# Patient Record
Sex: FEMALE | Race: WHITE | NOT HISPANIC OR LATINO | Employment: UNEMPLOYED | URBAN - METROPOLITAN AREA
[De-identification: names, ages, dates, MRNs, and addresses within clinical notes are randomized per-mention and may not be internally consistent; named-entity substitution may affect disease eponyms.]

---

## 2018-07-30 ENCOUNTER — HOSPITAL ENCOUNTER (EMERGENCY)
Facility: HOSPITAL | Age: 48
Discharge: HOME | End: 2018-07-30
Attending: EMERGENCY MEDICINE
Payer: MEDICAID

## 2018-07-30 VITALS
DIASTOLIC BLOOD PRESSURE: 80 MMHG | RESPIRATION RATE: 18 BRPM | TEMPERATURE: 98 F | OXYGEN SATURATION: 99 % | SYSTOLIC BLOOD PRESSURE: 145 MMHG | HEART RATE: 99 BPM

## 2018-07-30 DIAGNOSIS — M54.50 ACUTE LOW BACK PAIN WITHOUT SCIATICA, UNSPECIFIED BACK PAIN LATERALITY: Primary | ICD-10-CM

## 2018-07-30 DIAGNOSIS — M54.50 LOWER BACK PAIN: ICD-10-CM

## 2018-07-30 PROCEDURE — 25000003 PHARM REV CODE 250: Performed by: NURSE PRACTITIONER

## 2018-07-30 PROCEDURE — 96372 THER/PROPH/DIAG INJ SC/IM: CPT

## 2018-07-30 PROCEDURE — 99283 EMERGENCY DEPT VISIT LOW MDM: CPT | Mod: 25

## 2018-07-30 PROCEDURE — 63600175 PHARM REV CODE 636 W HCPCS: Performed by: NURSE PRACTITIONER

## 2018-07-30 RX ORDER — CYCLOBENZAPRINE HCL 10 MG
10 TABLET ORAL 3 TIMES DAILY PRN
Qty: 15 TABLET | Refills: 0 | Status: SHIPPED | OUTPATIENT
Start: 2018-07-30 | End: 2018-08-04

## 2018-07-30 RX ORDER — ONDANSETRON 8 MG/1
8 TABLET, ORALLY DISINTEGRATING ORAL
Status: COMPLETED | OUTPATIENT
Start: 2018-07-30 | End: 2018-07-30

## 2018-07-30 RX ORDER — MORPHINE SULFATE 4 MG/ML
6 INJECTION, SOLUTION INTRAMUSCULAR; INTRAVENOUS
Status: COMPLETED | OUTPATIENT
Start: 2018-07-30 | End: 2018-07-30

## 2018-07-30 RX ORDER — ACETAMINOPHEN AND CODEINE PHOSPHATE 300; 30 MG/1; MG/1
1-2 TABLET ORAL EVERY 6 HOURS PRN
Qty: 14 TABLET | Refills: 0 | Status: SHIPPED | OUTPATIENT
Start: 2018-07-30 | End: 2019-03-11

## 2018-07-30 RX ADMIN — ONDANSETRON 8 MG: 8 TABLET, ORALLY DISINTEGRATING ORAL at 03:07

## 2018-07-30 RX ADMIN — MORPHINE SULFATE 6 MG: 4 INJECTION INTRAVENOUS at 03:07

## 2018-07-30 NOTE — ED PROVIDER NOTES
Encounter Date: 2018       History     Chief Complaint   Patient presents with    Back Pain     chronic back pain, now pt states she cannot stand up straight. reports hearing a pop in the shower.      48 year old female with complaint of lower back pain chronically with worsening over the past 3 days.  Reports that she bent over a few days ago and felt pop in back when standing.  No recent fall.  No fever or chills.  Pain worse with any movement.  No loss of bowel or bladder function.           Review of patient's allergies indicates:   Allergen Reactions    Penicillins Swelling     Past Medical History:   Diagnosis Date    Chronic back pain     Migraine      Past Surgical History:   Procedure Laterality Date     SECTION      HYSTERECTOMY       History reviewed. No pertinent family history.  Social History   Substance Use Topics    Smoking status: Current Every Day Smoker     Packs/day: 1.00     Types: Cigarettes    Smokeless tobacco: Not on file    Alcohol use Yes      Comment: occ     Review of Systems   Constitutional: Negative for fever.   HENT: Negative for sore throat.    Respiratory: Negative for shortness of breath.    Cardiovascular: Negative for chest pain.   Gastrointestinal: Negative for nausea.   Genitourinary: Negative for dysuria.   Musculoskeletal: Positive for back pain.   Skin: Negative for rash.   Neurological: Negative for weakness.   Hematological: Does not bruise/bleed easily.       Physical Exam     Initial Vitals [18 1335]   BP Pulse Resp Temp SpO2   (!) 145/80 99 18 97.8 °F (36.6 °C) 99 %      MAP       --         Physical Exam    Nursing note and vitals reviewed.  Constitutional: She appears well-developed and well-nourished.   HENT:   Head: Normocephalic and atraumatic.   Eyes: Conjunctivae and EOM are normal. Pupils are equal, round, and reactive to light.   Neck: Normal range of motion. Neck supple.   Cardiovascular: Normal rate, regular rhythm, normal heart  sounds and intact distal pulses.   Pulmonary/Chest: Breath sounds normal.   Abdominal: Soft. There is no tenderness. There is no rebound and no guarding.   Musculoskeletal: Normal range of motion.   Diffuse lumbar tenderness, pain with ROM of lumbar spine, straight leg negative   Neurological: She is alert and oriented to person, place, and time. She has normal strength and normal reflexes.   Skin: Skin is warm and dry.   Psychiatric: She has a normal mood and affect. Her behavior is normal. Thought content normal.         ED Course   Procedures  Labs Reviewed - No data to display       Imaging Results          X-Ray Lumbar Spine Ap And Lateral (Final result)  Result time 07/30/18 14:57:19    Final result by Sammy Scott MD (07/30/18 14:57:19)                 Impression:      Chronic L1 compression fracture, 50-75% loss of height.      Electronically signed by: Sammy Scott MD  Date:    07/30/2018  Time:    14:57             Narrative:    EXAMINATION:  XR LUMBAR SPINE AP AND LATERAL    CLINICAL HISTORY:  Low back pain, <6wks, no red flags, no prior management;Low back pain    FINDINGS:  There is a chronic compression fracture deformity, L1 vertebral body, with anterior wedging and 57 5% loss of height anteriorly, no change configuration compared with the remote chest x-ray 05/02/2017.  There is no new compression deformity of the lumbar spine.  No malalignment.  Disc space narrowing and anterior spurring noted at T12-L1.  Mild L1-2 spondylosis.  Otherwise, the disc intervals are unremarkable.                                                      Clinical Impression:   The primary encounter diagnosis was Acute low back pain without sciatica, unspecified back pain laterality. A diagnosis of Lower back pain was also pertinent to this visit.                             Raghav Ho NP  07/30/18 0959

## 2019-02-12 ENCOUNTER — HOSPITAL ENCOUNTER (EMERGENCY)
Facility: HOSPITAL | Age: 49
Discharge: HOME | End: 2019-02-12
Attending: FAMILY MEDICINE
Payer: MEDICAID

## 2019-02-12 VITALS
RESPIRATION RATE: 16 BRPM | SYSTOLIC BLOOD PRESSURE: 120 MMHG | OXYGEN SATURATION: 97 % | TEMPERATURE: 99 F | DIASTOLIC BLOOD PRESSURE: 78 MMHG | WEIGHT: 124.75 LBS | HEART RATE: 84 BPM | BODY MASS INDEX: 22.82 KG/M2

## 2019-02-12 DIAGNOSIS — J40 BRONCHITIS: ICD-10-CM

## 2019-02-12 DIAGNOSIS — R05.9 COUGH WITH FEVER: ICD-10-CM

## 2019-02-12 DIAGNOSIS — J10.1 INFLUENZA A: Primary | ICD-10-CM

## 2019-02-12 DIAGNOSIS — R50.9 COUGH WITH FEVER: ICD-10-CM

## 2019-02-12 LAB
INFLUENZA A, MOLECULAR: POSITIVE
INFLUENZA B, MOLECULAR: NEGATIVE
SPECIMEN SOURCE: ABNORMAL

## 2019-02-12 PROCEDURE — 25000242 PHARM REV CODE 250 ALT 637 W/ HCPCS: Performed by: NURSE PRACTITIONER

## 2019-02-12 PROCEDURE — 94640 AIRWAY INHALATION TREATMENT: CPT

## 2019-02-12 PROCEDURE — 63600175 PHARM REV CODE 636 W HCPCS: Performed by: NURSE PRACTITIONER

## 2019-02-12 PROCEDURE — 96372 THER/PROPH/DIAG INJ SC/IM: CPT

## 2019-02-12 PROCEDURE — 87502 INFLUENZA DNA AMP PROBE: CPT

## 2019-02-12 PROCEDURE — 99284 EMERGENCY DEPT VISIT MOD MDM: CPT | Mod: 25

## 2019-02-12 RX ORDER — PREDNISONE 20 MG/1
20 TABLET ORAL 2 TIMES DAILY
Qty: 10 TABLET | Refills: 0 | Status: SHIPPED | OUTPATIENT
Start: 2019-02-12 | End: 2019-02-17

## 2019-02-12 RX ORDER — IPRATROPIUM BROMIDE AND ALBUTEROL SULFATE 2.5; .5 MG/3ML; MG/3ML
3 SOLUTION RESPIRATORY (INHALATION)
Status: COMPLETED | OUTPATIENT
Start: 2019-02-12 | End: 2019-02-12

## 2019-02-12 RX ORDER — OSELTAMIVIR PHOSPHATE 75 MG/1
75 CAPSULE ORAL 2 TIMES DAILY
Qty: 10 CAPSULE | Refills: 0 | Status: SHIPPED | OUTPATIENT
Start: 2019-02-12 | End: 2019-02-17

## 2019-02-12 RX ORDER — ALBUTEROL SULFATE 90 UG/1
1-2 AEROSOL, METERED RESPIRATORY (INHALATION) EVERY 6 HOURS PRN
Qty: 1 INHALER | Refills: 0 | Status: SHIPPED | OUTPATIENT
Start: 2019-02-12 | End: 2019-10-25

## 2019-02-12 RX ORDER — BENZONATATE 100 MG/1
100 CAPSULE ORAL 3 TIMES DAILY PRN
Qty: 20 CAPSULE | Refills: 0 | Status: SHIPPED | OUTPATIENT
Start: 2019-02-12 | End: 2019-02-22

## 2019-02-12 RX ADMIN — METHYLPREDNISOLONE SODIUM SUCCINATE 80 MG: 40 INJECTION, POWDER, FOR SOLUTION INTRAMUSCULAR; INTRAVENOUS at 08:02

## 2019-02-12 RX ADMIN — IPRATROPIUM BROMIDE AND ALBUTEROL SULFATE 3 ML: .5; 3 SOLUTION RESPIRATORY (INHALATION) at 07:02

## 2019-02-13 NOTE — DISCHARGE INSTRUCTIONS
**Follow up with PCP in 24-48 hours. Return to ER with worsening of symptoms.     **Over the counter tylenol or motrin as needed for pain and/or fever as directed on package insert. Drink plenty fluids. Get plenty rest. Wash hands frequently.     **Our goal in the emergency department is to always give you outstanding care and exceptional service. You may receive a survey by mail or e-mail in the next week regarding your experience in our ED. We would greatly appreciate your completing and returning the survey. Your feedback provides us with a way to recognize our staff who give very good care and it helps us learn how to improve when your experience was below our aspiration of excellence.      Rest; drink lots of fluids(at least 10 glasses of water daily).  Avoid crowded areas  To avoid spreading: cover your mouth when coughing or sneezing  Use tissues when you blow your nose. Dispose of them and then wash your hands  Do not share drinking glasses  Wash your hands with soap and water or use hand .

## 2019-02-13 NOTE — ED PROVIDER NOTES
Encounter Date: 2019       History     Chief Complaint   Patient presents with    Cough     dry cough x2 months, no otc meds taken     Steph DANYELL Harkins is a 48 y.o. Female who presents to the ED with reports of nasal congestion, sore throat and cough. Symptoms began X 2 days ago. Subjective fever of  .0F at home taken with oral thermometer with associated generalized body aches and chills. Clear nasal discharge; denies sinus pressure or pain; denies headache or tooth sensitivity. Throat pain with swallowing; denies difficulty swallowing. Strong, loose NP cough noted; reports cough present X 3 months; denies ever seeing PCP for cough. Denies cp or sob; denies wheezing. +exposure to sick family members. Reports taking otc cough and cold medicine with little relief. Patient is a smoker. Denies receiving influenza vaccine.       The history is provided by the patient.     Review of patient's allergies indicates:   Allergen Reactions    Penicillins Swelling     Past Medical History:   Diagnosis Date    Chronic back pain     Migraine      Past Surgical History:   Procedure Laterality Date     SECTION      HYSTERECTOMY       History reviewed. No pertinent family history.  Social History     Tobacco Use    Smoking status: Current Every Day Smoker     Packs/day: 1.00     Types: Cigarettes   Substance Use Topics    Alcohol use: Yes     Comment: occ    Drug use: No     Review of Systems   Constitutional: Positive for chills. Negative for activity change and fever.   HENT: Positive for congestion, postnasal drip, rhinorrhea and sore throat. Negative for ear discharge, ear pain, sinus pressure and sinus pain.    Eyes: Negative.    Respiratory: Positive for cough. Negative for chest tightness and shortness of breath.    Cardiovascular: Negative.  Negative for chest pain.   Gastrointestinal: Negative.  Negative for abdominal distention, abdominal pain and nausea.   Endocrine: Negative.    Genitourinary:  Negative.  Negative for dysuria, frequency and urgency.   Musculoskeletal: Negative.  Negative for back pain.   Skin: Negative.  Negative for rash.   Allergic/Immunologic: Negative.    Neurological: Negative.  Negative for dizziness, weakness, light-headedness and numbness.   Hematological: Negative.  Does not bruise/bleed easily.   Psychiatric/Behavioral: Negative.        Physical Exam     Initial Vitals [02/12/19 1815]   BP Pulse Resp Temp SpO2   121/78 88 (!) 22 99.7 °F (37.6 °C) 99 %      MAP       --         Physical Exam    Nursing note and vitals reviewed.  Constitutional: She appears well-developed and well-nourished.   HENT:   Head: Normocephalic and atraumatic.   Right Ear: External ear normal.   Left Ear: External ear normal.   Nose: Rhinorrhea present.   Mouth/Throat: Uvula is midline and mucous membranes are normal. No oropharyngeal exudate, posterior oropharyngeal edema or posterior oropharyngeal erythema.   Eyes: Conjunctivae and EOM are normal. Pupils are equal, round, and reactive to light.   Neck: Normal range of motion. Neck supple.   Cardiovascular: Normal rate, regular rhythm, normal heart sounds and intact distal pulses.   Pulmonary/Chest: Effort normal. No tachypnea. No respiratory distress. She has no decreased breath sounds. She has no wheezes. She has rhonchi. She has no rales.   Rhonchi to BUL's; clears with cough. Strong, loose NP cough noted.    Abdominal: Soft. Bowel sounds are normal. There is no tenderness.   Musculoskeletal: Normal range of motion.   Neurological: She is alert and oriented to person, place, and time. She has normal strength. She displays normal reflexes. No cranial nerve deficit or sensory deficit.   Skin: Skin is warm and dry. Capillary refill takes less than 2 seconds. No rash noted.   Psychiatric: She has a normal mood and affect. Her behavior is normal. Judgment and thought content normal.         ED Course   Procedures  Labs Reviewed   INFLUENZA A & B BY  MOLECULAR - Abnormal; Notable for the following components:       Result Value    Influenza A, Molecular Positive (*)     All other components within normal limits          Imaging Results          X-Ray Chest PA And Lateral (Final result)  Result time 02/12/19 19:30:04    Final result by Godfrey Bull Jr., MD (02/12/19 19:30:04)                 Impression:      No acute findings.      Electronically signed by: Godfrey Bull MD  Date:    02/12/2019  Time:    19:30             Narrative:    EXAMINATION:  XR CHEST PA AND LATERAL    CLINICAL HISTORY:  Cough    COMPARISON:  No comparison studies are available.    FINDINGS:  Heart size is normal.  Lungs appear clear of active disease.  No infiltrates or effusions.  No suspicious mass.                                  Medications   albuterol-ipratropium 2.5 mg-0.5 mg/3 mL nebulizer solution 3 mL (3 mLs Nebulization Given 2/12/19 1929)   methylPREDNISolone sodium succinate injection 80 mg (80 mg Intramuscular Given 2/12/19 2020)                          Clinical Impression:   The primary encounter diagnosis was Influenza A. Diagnoses of Cough with fever and Bronchitis were also pertinent to this visit.      Disposition:   Disposition: Discharged  Condition: Stable    Discharged with prescription for tamiflu, prednisone, tessalon, and albuterol inhaler. The patient acknowledges that close follow up with medical provider is required. Instructed to follow up with PCP within 2 days. Patient was given specific return precautions. The patient agrees to comply with all instruction and directions given in the ER. List of providers provided to patient.                     Lelia Cloud NP  02/12/19 [id number]

## 2019-02-13 NOTE — ED TRIAGE NOTES
48 y.o. female presents to ER Room/bed info not found   Chief Complaint   Patient presents with    Cough     dry cough x2 months, no otc meds taken   . No acute distress noted.

## 2019-02-14 PROBLEM — Z72.0 TOBACCO ABUSE: Status: ACTIVE | Noted: 2019-02-14

## 2019-02-14 PROBLEM — J10.1 INFLUENZA A: Status: ACTIVE | Noted: 2019-02-14

## 2019-03-14 ENCOUNTER — LAB VISIT (OUTPATIENT)
Dept: LAB | Facility: HOSPITAL | Age: 49
End: 2019-03-14
Payer: MEDICAID

## 2019-03-14 DIAGNOSIS — Z12.11 COLON CANCER SCREENING: ICD-10-CM

## 2019-03-14 LAB — HEMOCCULT STL QL IA: NEGATIVE

## 2019-03-14 PROCEDURE — 82274 ASSAY TEST FOR BLOOD FECAL: CPT

## 2019-04-01 PROBLEM — J10.1 INFLUENZA A: Status: RESOLVED | Noted: 2019-02-14 | Resolved: 2019-04-01

## 2019-07-31 ENCOUNTER — HOSPITAL ENCOUNTER (EMERGENCY)
Facility: HOSPITAL | Age: 49
Discharge: HOME | End: 2019-07-31
Attending: SURGERY
Payer: MEDICAID

## 2019-07-31 VITALS
DIASTOLIC BLOOD PRESSURE: 98 MMHG | RESPIRATION RATE: 18 BRPM | HEART RATE: 82 BPM | SYSTOLIC BLOOD PRESSURE: 132 MMHG | TEMPERATURE: 99 F | OXYGEN SATURATION: 96 %

## 2019-07-31 DIAGNOSIS — M25.562 LEFT KNEE PAIN, UNSPECIFIED CHRONICITY: Primary | ICD-10-CM

## 2019-07-31 PROCEDURE — 99284 EMERGENCY DEPT VISIT MOD MDM: CPT | Mod: 25

## 2019-07-31 PROCEDURE — 29505 APPLICATION LONG LEG SPLINT: CPT | Mod: LT

## 2019-07-31 PROCEDURE — 25000003 PHARM REV CODE 250: Performed by: NURSE PRACTITIONER

## 2019-07-31 RX ORDER — KETOROLAC TROMETHAMINE 10 MG/1
10 TABLET, FILM COATED ORAL EVERY 6 HOURS PRN
Qty: 15 TABLET | Refills: 0 | Status: SHIPPED | OUTPATIENT
Start: 2019-07-31 | End: 2020-03-03 | Stop reason: ALTCHOICE

## 2019-07-31 RX ORDER — KETOROLAC TROMETHAMINE 10 MG/1
10 TABLET, FILM COATED ORAL
Status: COMPLETED | OUTPATIENT
Start: 2019-07-31 | End: 2019-07-31

## 2019-07-31 RX ORDER — CYCLOBENZAPRINE HCL 10 MG
10 TABLET ORAL 3 TIMES DAILY PRN
Qty: 10 TABLET | Refills: 0 | Status: SHIPPED | OUTPATIENT
Start: 2019-07-31 | End: 2019-08-05

## 2019-07-31 RX ADMIN — KETOROLAC TROMETHAMINE 10 MG: 10 TABLET, FILM COATED ORAL at 01:07

## 2019-07-31 NOTE — ED PROVIDER NOTES
Encounter Date: 2019       History     Chief Complaint   Patient presents with    Knee Pain     The history is provided by the patient.   Leg Pain    The injury mechanism was a fall. The incident occurred several days ago (3). The pain is present in the left knee. The pain has been constant since onset. Pertinent negatives include no numbness, no inability to bear weight, no loss of motion, no muscle weakness, no loss of sensation and no tingling. She reports no foreign bodies present. The symptoms are aggravated by activity, bearing weight and palpation.     Review of patient's allergies indicates:   Allergen Reactions    Bee sting [allergen ext-venom-honey bee] Swelling    Penicillins Swelling    Peas Swelling     Past Medical History:   Diagnosis Date    Asthma     Chronic back pain     Migraine      Past Surgical History:   Procedure Laterality Date     SECTION      HYSTERECTOMY      total    OOPHORECTOMY       Family History   Problem Relation Age of Onset    Hypertension Mother     Heart disease Mother     No Known Problems Father     Cancer Maternal Aunt     Heart disease Maternal Uncle     Breast cancer Maternal Aunt     Cancer Maternal Cousin      Social History     Tobacco Use    Smoking status: Current Every Day Smoker     Packs/day: 1.00     Years: 36.00     Pack years: 36.00     Types: Cigarettes    Smokeless tobacco: Never Used   Substance Use Topics    Alcohol use: Yes     Comment: occ    Drug use: No     Review of Systems   Constitutional: Negative for fever.   HENT: Negative for congestion, ear pain, rhinorrhea, sore throat and trouble swallowing.    Eyes: Negative for pain, discharge, redness and visual disturbance.   Respiratory: Negative for cough, shortness of breath and wheezing.    Cardiovascular: Negative for chest pain and leg swelling.   Gastrointestinal: Negative for abdominal pain, constipation, diarrhea, nausea and vomiting.   Genitourinary: Negative for  difficulty urinating, dysuria, flank pain, frequency and urgency.   Musculoskeletal: Positive for arthralgias (L knee). Negative for back pain, myalgias and neck pain.   Skin: Negative for rash and wound.   Neurological: Negative for tingling, seizures, weakness, numbness and headaches.   Psychiatric/Behavioral: Negative.        Physical Exam     Initial Vitals [07/31/19 1303]   BP Pulse Resp Temp SpO2   (!) 132/98 82 18 98.9 °F (37.2 °C) 96 %      MAP       --         Physical Exam    Nursing note and vitals reviewed.  Constitutional: No distress.   HENT:   Head: Normocephalic and atraumatic.   Right Ear: External ear normal.   Left Ear: External ear normal.   Nose: Nose normal.   Mouth/Throat: Oropharynx is clear and moist.   Eyes: Conjunctivae, EOM and lids are normal. Pupils are equal, round, and reactive to light.   Neck: Neck supple.   Cardiovascular: Normal rate, regular rhythm, S1 normal, S2 normal, normal heart sounds and intact distal pulses.   Pulmonary/Chest: Effort normal and breath sounds normal. No respiratory distress.   Abdominal: Soft. Bowel sounds are normal. There is no tenderness.   Musculoskeletal: Normal range of motion.        Left knee: She exhibits swelling and ecchymosis. She exhibits normal range of motion, no deformity, no laceration, no erythema, normal alignment and normal patellar mobility. Tenderness found. Medial joint line tenderness noted.   Neurological: She is alert and oriented to person, place, and time. She has normal strength. GCS eye subscore is 4. GCS verbal subscore is 5. GCS motor subscore is 6.   Skin: Skin is warm and dry. Capillary refill takes less than 2 seconds. No rash noted.   Psychiatric: She has a normal mood and affect. Her speech is normal and behavior is normal.         ED Course   Procedures  Labs Reviewed - No data to display       Imaging Results          X-Ray Knee 1 or 2 View Left (Final result)  Result time 07/31/19 13:46:07    Final result by Stewart  Lyle Menezes MD (07/31/19 13:46:07)                 Impression:      No evidence of an acute osseous abnormality.      Electronically signed by: Stewart Menezes MD  Date:    07/31/2019  Time:    13:46             Narrative:    EXAMINATION:  XR KNEE 1 OR 2 VIEW LEFT    CLINICAL HISTORY:  .  Pain in left knee    TECHNIQUE:  Two views of the left knee dated July 31, 2019.    COMPARISON:  No prior study for comparison.    FINDINGS:  There is no evidence of fracture, dislocation or other acute osseous abnormality. No focal soft tissue abnormality.                                     Medications   ketorolac tablet 10 mg (10 mg Oral Given 7/31/19 1330)                       Clinical Impression:       ICD-10-CM ICD-9-CM   1. Left knee pain, unspecified chronicity M25.562 719.46     New Prescriptions    CYCLOBENZAPRINE (FLEXERIL) 10 MG TABLET    Take 1 tablet (10 mg total) by mouth 3 (three) times daily as needed for Muscle spasms.    KETOROLAC (TORADOL) 10 MG TABLET    Take 1 tablet (10 mg total) by mouth every 6 (six) hours as needed for Pain.       Disposition:   Disposition: Discharged  Condition: Stable    The patient acknowledges that close follow up with medical provider is required. Instructed to follow up with PCP within 2 days. Patient was given specific return precautions. The patient agrees to comply with all instruction and directions given in the ER.                         Sophie Fernández NP  07/31/19 9028

## 2019-07-31 NOTE — ED TRIAGE NOTES
49 y.o. female presents to ER Room/bed info not found   Chief Complaint   Patient presents with    Knee Pain   .   C/o left knee pain since a fall on Sunday

## 2019-08-05 ENCOUNTER — TELEPHONE (OUTPATIENT)
Dept: EMERGENCY MEDICINE | Facility: HOSPITAL | Age: 49
End: 2019-08-05

## 2019-08-20 ENCOUNTER — PATIENT OUTREACH (OUTPATIENT)
Dept: ADMINISTRATIVE | Facility: HOSPITAL | Age: 49
End: 2019-08-20

## 2019-10-25 ENCOUNTER — HOSPITAL ENCOUNTER (EMERGENCY)
Facility: HOSPITAL | Age: 49
Discharge: HOME | End: 2019-10-25
Attending: SURGERY
Payer: MEDICAID

## 2019-10-25 VITALS
HEART RATE: 78 BPM | WEIGHT: 124 LBS | OXYGEN SATURATION: 95 % | RESPIRATION RATE: 21 BRPM | TEMPERATURE: 98 F | HEIGHT: 64 IN | DIASTOLIC BLOOD PRESSURE: 72 MMHG | BODY MASS INDEX: 21.17 KG/M2 | SYSTOLIC BLOOD PRESSURE: 127 MMHG

## 2019-10-25 DIAGNOSIS — J40 BRONCHITIS: Primary | ICD-10-CM

## 2019-10-25 PROCEDURE — 25000242 PHARM REV CODE 250 ALT 637 W/ HCPCS: Performed by: SURGERY

## 2019-10-25 PROCEDURE — 94640 AIRWAY INHALATION TREATMENT: CPT

## 2019-10-25 PROCEDURE — 63600175 PHARM REV CODE 636 W HCPCS: Performed by: SURGERY

## 2019-10-25 PROCEDURE — 96372 THER/PROPH/DIAG INJ SC/IM: CPT

## 2019-10-25 PROCEDURE — 99284 EMERGENCY DEPT VISIT MOD MDM: CPT | Mod: 25

## 2019-10-25 RX ORDER — PROMETHAZINE HYDROCHLORIDE AND DEXTROMETHORPHAN HYDROBROMIDE 6.25; 15 MG/5ML; MG/5ML
5 SYRUP ORAL EVERY 6 HOURS PRN
Qty: 118 ML | Refills: 0 | Status: SHIPPED | OUTPATIENT
Start: 2019-10-25 | End: 2019-11-04

## 2019-10-25 RX ORDER — LEVOFLOXACIN 500 MG/1
500 TABLET, FILM COATED ORAL DAILY
Qty: 7 TABLET | Refills: 0 | Status: SHIPPED | OUTPATIENT
Start: 2019-10-25 | End: 2019-11-01

## 2019-10-25 RX ORDER — ALBUTEROL SULFATE 90 UG/1
1-2 AEROSOL, METERED RESPIRATORY (INHALATION) EVERY 6 HOURS PRN
Qty: 1 INHALER | Refills: 0 | Status: SHIPPED | OUTPATIENT
Start: 2019-10-25 | End: 2024-03-07

## 2019-10-25 RX ORDER — METHYLPREDNISOLONE SOD SUCC 125 MG
125 VIAL (EA) INJECTION
Status: COMPLETED | OUTPATIENT
Start: 2019-10-25 | End: 2019-10-25

## 2019-10-25 RX ORDER — IPRATROPIUM BROMIDE AND ALBUTEROL SULFATE 2.5; .5 MG/3ML; MG/3ML
3 SOLUTION RESPIRATORY (INHALATION)
Status: COMPLETED | OUTPATIENT
Start: 2019-10-25 | End: 2019-10-25

## 2019-10-25 RX ORDER — METHYLPREDNISOLONE 4 MG/1
TABLET ORAL
Qty: 1 PACKAGE | Refills: 0 | Status: SHIPPED | OUTPATIENT
Start: 2019-10-25 | End: 2020-03-03 | Stop reason: ALTCHOICE

## 2019-10-25 RX ADMIN — IPRATROPIUM BROMIDE AND ALBUTEROL SULFATE 3 ML: .5; 3 SOLUTION RESPIRATORY (INHALATION) at 04:10

## 2019-10-25 RX ADMIN — METHYLPREDNISOLONE SODIUM SUCCINATE 125 MG: 125 INJECTION, POWDER, FOR SOLUTION INTRAMUSCULAR; INTRAVENOUS at 05:10

## 2019-10-25 NOTE — ED PROVIDER NOTES
[facility]                                                 Chief Complaint  49 y.o. female with Cough (x 1 week)    History of Present Illness  Steph Harkins presents to the emergency room with cough and congestion  Patient has cough and congestion with no active wheezing on ER evaluation  Patient is a long-time smoker but quit smoking last week because of cough  Patient on exam has rhonchi with no active wheezing, 95% oxygenation today  No obvious brown shortness of breath and denies any sputum production today    The history is provided by the patient   device was not used during this ER visit  Medical history: Asthma, chronic back pain, migraine  Surgeries: , hysterectomy, oophorectomy  Allergies: Bee stings, penicillins, peas    I have reviewed all of this patient's past medical, surgical, family, and social   histories as well as active allergies and medications documented in the  electronic medical record    Review of Systems and Physical Exam      Review of Systems  -- Constitution - no fever, denies fatigue, no weakness, no chills  -- Eyes - no tearing or redness, no visual disturbance  -- Ear, Nose - no tinnitus or earache, no nasal congestion or discharge  -- Mouth,Throat - no sore throat, no toothache, normal voice, normal swallowing  -- Respiratory - cough and congestion, no shortness of breath, no SOLOMON  -- Cardiovascular - denies chest pain, no palpitations, denies claudication  -- Gastrointestinal - denies abdominal pain, nausea, vomiting, or diarrhea  -- Genitourinary - no dysuria, denies flank pain, no hematuria, no STD risk  -- Musculoskeletal - denies back pain, negative for trauma or injury  -- Neurological - no headache, denies weakness or seizure; no LOC  -- Skin - denies pallor, rash, or changes in skin. no hives or welts noted  -- Psychiatric - Denies SI or HI, no psychosis or fractured thought noted     Vital Signs  Her temperature is 97.7 °F  (36.5 °C).   Her pulse is 92.   Her respiration is 22 and oxygen saturation is 95%.     Physical Exam  -- Nursing note and vitals reviewed  -- Constitutional: Appears well-developed and well-nourished  -- Head: Atraumatic. Normocephalic. No obvious abnormality  -- Eyes: Pupils are equal and reactive to light. Normal conjunctiva and lids  -- Nose: Nose normal in appearance, nares grossly normal. No discharge  -- Throat: Mucous membranes moist, pharynx normal, normal tonsils. No lesions   -- Ears: External ears and TM normal bilaterally. Normal hearing and no drainage  -- Neck: Normal range of motion. Neck supple. No masses, trachea midline  -- Cardiac: Normal rate, regular rhythm and normal heart sounds  -- Pulmonary: faint rhonchi at the bilateral bases with no active wheezing   -- Abdominal: Soft, no tenderness. Normal bowel sounds. Normal liver edge  -- Musculoskeletal: Normal range of motion, no effusions. Joints stable   -- Neurological: No focal deficits. Showed good interaction with staff  -- Vascular: Posterior tibial, dorsalis pedis and radial pulses 2+ bilaterally      Emergency Room Course      Treatment and Evaluation  -- Chest x-ray showed no infiltrate and showed no acute pathology  --  mg Solumedrol given today in the ER  -- Duoneb breathing treatment given today in the ER    Diagnosis  -- The encounter diagnosis was Bronchitis.    Disposition and Plan  -- Disposition: home  -- Condition: stable  -- Follow-up: Patient to follow up with TINO Emmanuel in 1-2 days.  -- I advised the patient that we have found no life threatening condition today  -- At this time, I believe the patient is clinically stable for discharge.   -- The patient acknowledges that close follow up with a MD is required   -- Patient agrees to comply with all instruction and direction given in the ER    Discharge Medications  albuterol 90 mcg/actuation inhaler  levoFLOXacin 500 MG tablet  methylPREDNISolone 4 mg  tablet    This note is dictated on M*Modal word recognition program.  There are word recognition mistakes that are occasionally missed on review.         Elvis Valentino MD  10/25/19 0997

## 2019-10-25 NOTE — ED TRIAGE NOTES
49 y.o. female presents to ER ED 02/ED 02A   Chief Complaint   Patient presents with    Cough     x 1 week

## 2020-03-03 PROBLEM — F41.1 GAD (GENERALIZED ANXIETY DISORDER): Status: ACTIVE | Noted: 2020-03-03

## 2020-03-03 PROBLEM — E78.01 FAMILIAL HYPERCHOLESTEROLEMIA: Status: ACTIVE | Noted: 2020-03-03

## 2020-04-11 ENCOUNTER — LAB VISIT (OUTPATIENT)
Dept: LAB | Facility: HOSPITAL | Age: 50
End: 2020-04-11
Attending: NURSE PRACTITIONER
Payer: MEDICAID

## 2020-04-11 DIAGNOSIS — Z12.11 SCREEN FOR COLON CANCER: ICD-10-CM

## 2020-04-11 PROCEDURE — 82274 ASSAY TEST FOR BLOOD FECAL: CPT

## 2020-04-15 LAB — HEMOCCULT STL QL IA: NEGATIVE

## 2020-06-30 DIAGNOSIS — F41.1 GAD (GENERALIZED ANXIETY DISORDER): ICD-10-CM

## 2020-06-30 DIAGNOSIS — M25.569 KNEE PAIN, UNSPECIFIED CHRONICITY, UNSPECIFIED LATERALITY: Primary | ICD-10-CM

## 2021-04-09 DIAGNOSIS — R05.9 COUGH: ICD-10-CM

## 2021-04-09 DIAGNOSIS — R06.2 WHEEZING: Primary | ICD-10-CM

## 2021-04-09 DIAGNOSIS — Z11.59 SPECIAL SCREENING EXAMINATION FOR UNSPECIFIED VIRAL DISEASE: ICD-10-CM

## 2021-04-11 ENCOUNTER — HOSPITAL ENCOUNTER (OUTPATIENT)
Dept: PREADMISSION TESTING | Facility: HOSPITAL | Age: 51
Discharge: HOME | End: 2021-04-11
Attending: NURSE PRACTITIONER
Payer: MEDICAID

## 2021-04-11 DIAGNOSIS — Z11.59 SPECIAL SCREENING EXAMINATION FOR UNSPECIFIED VIRAL DISEASE: ICD-10-CM

## 2021-04-11 PROCEDURE — U0003 INFECTIOUS AGENT DETECTION BY NUCLEIC ACID (DNA OR RNA); SEVERE ACUTE RESPIRATORY SYNDROME CORONAVIRUS 2 (SARS-COV-2) (CORONAVIRUS DISEASE [COVID-19]), AMPLIFIED PROBE TECHNIQUE, MAKING USE OF HIGH THROUGHPUT TECHNOLOGIES AS DESCRIBED BY CMS-2020-01-R: HCPCS | Performed by: NURSE PRACTITIONER

## 2021-04-11 PROCEDURE — U0005 INFEC AGEN DETEC AMPLI PROBE: HCPCS | Performed by: NURSE PRACTITIONER

## 2021-04-12 LAB — SARS-COV-2 RNA RESP QL NAA+PROBE: NOT DETECTED

## 2021-04-14 ENCOUNTER — HOSPITAL ENCOUNTER (OUTPATIENT)
Dept: PULMONOLOGY | Facility: HOSPITAL | Age: 51
Discharge: HOME | End: 2021-04-14
Attending: NURSE PRACTITIONER
Payer: MEDICAID

## 2021-04-14 DIAGNOSIS — R06.2 WHEEZING: ICD-10-CM

## 2021-04-14 DIAGNOSIS — R05.9 COUGH: ICD-10-CM

## 2021-04-14 PROCEDURE — 94727 GAS DIL/WSHOT DETER LNG VOL: CPT

## 2021-04-14 PROCEDURE — 94060 PR EVAL OF BRONCHOSPASM: ICD-10-PCS | Mod: 26,,, | Performed by: INTERNAL MEDICINE

## 2021-04-14 PROCEDURE — 94060 EVALUATION OF WHEEZING: CPT | Mod: 26,,, | Performed by: INTERNAL MEDICINE

## 2021-04-14 PROCEDURE — 99900031 HC PATIENT EDUCATION (STAT)

## 2021-04-14 PROCEDURE — 94729 PR C02/MEMBANE DIFFUSE CAPACITY: ICD-10-PCS | Mod: 26,,, | Performed by: INTERNAL MEDICINE

## 2021-04-14 PROCEDURE — 94727 PR PULM FUNCTION TEST BY GAS: ICD-10-PCS | Mod: 26,,, | Performed by: INTERNAL MEDICINE

## 2021-04-14 PROCEDURE — 94060 EVALUATION OF WHEEZING: CPT

## 2021-04-14 PROCEDURE — 94729 DIFFUSING CAPACITY: CPT

## 2021-04-14 PROCEDURE — 94729 DIFFUSING CAPACITY: CPT | Mod: 26,,, | Performed by: INTERNAL MEDICINE

## 2021-04-14 PROCEDURE — 94727 GAS DIL/WSHOT DETER LNG VOL: CPT | Mod: 26,,, | Performed by: INTERNAL MEDICINE

## 2021-04-19 LAB
BRPFT: ABNORMAL
DLCO ADJ PRE: 3.69 ML/(MIN*MMHG) (ref 16.94–28.41)
DLCO SINGLE BREATH LLN: 16.94
DLCO SINGLE BREATH PRE REF: 16.3 %
DLCO SINGLE BREATH REF: 22.68
DLCOC SBVA LLN: 3.29
DLCOC SBVA PRE REF: 27 %
DLCOC SBVA REF: 4.93
DLCOC SINGLE BREATH LLN: 16.94
DLCOC SINGLE BREATH PRE REF: 16.3 %
DLCOC SINGLE BREATH REF: 22.68
DLCOVA LLN: 3.29
DLCOVA PRE REF: 27 %
DLCOVA PRE: 1.33 ML/(MIN*MMHG*L) (ref 3.29–6.56)
DLCOVA REF: 4.93
DLVAADJ PRE: 1.33 ML/(MIN*MMHG*L) (ref 3.29–6.56)
ERVN2 LLN: -16449.1
ERVN2 PRE REF: 0 %
ERVN2 PRE: 0 L (ref -16449.1–16450.9)
ERVN2 REF: 0.9
FEF 25 75 CHG: 45.1 %
FEF 25 75 LLN: 1.39
FEF 25 75 POST REF: 35 %
FEF 25 75 PRE REF: 24.2 %
FEF 25 75 REF: 2.52
FET100 CHG: 3.7 %
FEV1 CHG: 57.4 %
FEV1 FVC CHG: 49.8 %
FEV1 FVC LLN: 69
FEV1 FVC POST REF: 78.3 %
FEV1 FVC PRE REF: 52.3 %
FEV1 FVC REF: 81
FEV1 LLN: 1.97
FEV1 POST REF: 68.5 %
FEV1 PRE REF: 43.5 %
FEV1 REF: 2.54
FRCN2 LLN: 1.76
FRCN2 PRE REF: 125.3 %
FRCN2 REF: 2.58
FVC CHG: 5.1 %
FVC LLN: 2.46
FVC POST REF: 87.1 %
FVC PRE REF: 82.9 %
FVC REF: 3.16
IVC PRE: 1.14 L (ref 2.46–3.89)
IVC SINGLE BREATH LLN: 2.46
IVC SINGLE BREATH PRE REF: 35.9 %
IVC SINGLE BREATH REF: 3.16
MEP LLN: 63
MEP PRE REF: 8.1 %
MEP PRE: 6.48 CMH2O (ref 63.23–96.78)
MEP REF: 80
MIP LLN: 33
MIP PRE REF: 13.5 %
MIP PRE: 6.77 CMH2O (ref 33.23–66.78)
MIP REF: 50
MVV LLN: 79
MVV PRE REF: 28 %
MVV REF: 93
PEF CHG: 221.8 %
PEF LLN: 4.75
PEF POST REF: 73.7 %
PEF PRE REF: 22.9 %
PEF REF: 6.36
POST FEF 25 75: 0.88 L/S (ref 1.39–3.97)
POST FET 100: 6.81 SEC
POST FEV1 FVC: 63.08 % (ref 69.24–90.1)
POST FEV1: 1.74 L (ref 1.97–3.09)
POST FVC: 2.75 L (ref 2.46–3.89)
POST PEF: 4.69 L/S (ref 4.75–7.97)
PRE DLCO: 3.69 ML/(MIN*MMHG) (ref 16.94–28.41)
PRE FEF 25 75: 0.61 L/S (ref 1.39–3.97)
PRE FET 100: 6.57 SEC
PRE FEV1 FVC: 42.12 % (ref 69.24–90.1)
PRE FEV1: 1.1 L (ref 1.97–3.09)
PRE FRC N2: 3.23 L (ref 1.76–3.4)
PRE FVC: 2.62 L (ref 2.46–3.89)
PRE MVV: 25.95 L/MIN (ref 78.9–106.74)
PRE PEF: 1.46 L/S (ref 4.75–7.97)
RVN2 LLN: 1.11
RVN2 PRE REF: 192.1 %
RVN2 PRE: 3.23 L (ref 1.11–2.26)
RVN2 REF: 1.68
RVN2TLCN2 LLN: 27.05
RVN2TLCN2 PRE REF: 149.8 %
RVN2TLCN2 PRE: 54.88 % (ref 27.05–46.23)
RVN2TLCN2 REF: 36.64
TLCN2 LLN: 3.62
TLCN2 PRE REF: 127.9 %
TLCN2 PRE: 5.89 L (ref 3.62–5.59)
TLCN2 REF: 4.6
VA PRE: 2.78 L (ref 4.45–4.45)
VA SINGLE BREATH LLN: 4.45
VA SINGLE BREATH PRE REF: 62.5 %
VA SINGLE BREATH REF: 4.45
VCMAXN2 LLN: 2.46
VCMAXN2 PRE REF: 84 %
VCMAXN2 PRE: 2.66 L (ref 2.46–3.89)
VCMAXN2 REF: 3.16

## 2021-05-04 ENCOUNTER — PATIENT MESSAGE (OUTPATIENT)
Dept: RESEARCH | Facility: HOSPITAL | Age: 51
End: 2021-05-04

## 2021-05-10 ENCOUNTER — OFFICE VISIT (OUTPATIENT)
Dept: OBSTETRICS AND GYNECOLOGY | Facility: CLINIC | Age: 51
End: 2021-05-10
Payer: MEDICAID

## 2021-05-10 ENCOUNTER — PATIENT MESSAGE (OUTPATIENT)
Dept: RESEARCH | Facility: HOSPITAL | Age: 51
End: 2021-05-10

## 2021-05-10 VITALS
RESPIRATION RATE: 12 BRPM | SYSTOLIC BLOOD PRESSURE: 114 MMHG | OXYGEN SATURATION: 100 % | WEIGHT: 122.19 LBS | BODY MASS INDEX: 20.86 KG/M2 | HEIGHT: 64 IN | DIASTOLIC BLOOD PRESSURE: 72 MMHG | HEART RATE: 83 BPM

## 2021-05-10 DIAGNOSIS — M85.851 OSTEOPENIA OF NECKS OF BOTH FEMURS: ICD-10-CM

## 2021-05-10 DIAGNOSIS — Z01.419 ENCNTR FOR GYN EXAM (GENERAL) (ROUTINE) W/O ABN FINDINGS: Primary | ICD-10-CM

## 2021-05-10 DIAGNOSIS — M85.852 OSTEOPENIA OF NECKS OF BOTH FEMURS: ICD-10-CM

## 2021-05-10 PROCEDURE — 99386 PREV VISIT NEW AGE 40-64: CPT | Mod: S$PBB,,, | Performed by: STUDENT IN AN ORGANIZED HEALTH CARE EDUCATION/TRAINING PROGRAM

## 2021-05-10 PROCEDURE — 99213 OFFICE O/P EST LOW 20 MIN: CPT | Mod: PBBFAC | Performed by: STUDENT IN AN ORGANIZED HEALTH CARE EDUCATION/TRAINING PROGRAM

## 2021-05-10 PROCEDURE — 99386 PR PREVENTIVE VISIT,NEW,40-64: ICD-10-PCS | Mod: S$PBB,,, | Performed by: STUDENT IN AN ORGANIZED HEALTH CARE EDUCATION/TRAINING PROGRAM

## 2021-05-10 PROCEDURE — 99999 PR PBB SHADOW E&M-EST. PATIENT-LVL III: ICD-10-PCS | Mod: PBBFAC,,, | Performed by: STUDENT IN AN ORGANIZED HEALTH CARE EDUCATION/TRAINING PROGRAM

## 2021-05-10 PROCEDURE — 99999 PR PBB SHADOW E&M-EST. PATIENT-LVL III: CPT | Mod: PBBFAC,,, | Performed by: STUDENT IN AN ORGANIZED HEALTH CARE EDUCATION/TRAINING PROGRAM

## 2021-05-10 RX ORDER — ALENDRONATE SODIUM 70 MG/1
70 TABLET ORAL
Qty: 4 TABLET | Refills: 11 | Status: SHIPPED | OUTPATIENT
Start: 2021-05-10 | End: 2024-02-19 | Stop reason: SDUPTHER

## 2021-05-10 RX ORDER — MONTELUKAST SODIUM 10 MG/1
TABLET ORAL
COMMUNITY
Start: 2021-04-27

## 2021-05-10 RX ORDER — ERGOCALCIFEROL 1.25 MG/1
CAPSULE ORAL
COMMUNITY
Start: 2021-04-09 | End: 2024-03-05

## 2021-05-10 RX ORDER — ERGOCALCIFEROL 1.25 MG/1
CAPSULE ORAL
COMMUNITY
Start: 2021-04-09 | End: 2021-05-10

## 2021-05-10 RX ORDER — BENZONATATE 100 MG/1
CAPSULE ORAL
COMMUNITY

## 2021-05-10 RX ORDER — VENLAFAXINE HYDROCHLORIDE 150 MG/1
CAPSULE, EXTENDED RELEASE ORAL
COMMUNITY
Start: 2021-04-27 | End: 2021-05-10

## 2021-05-10 RX ORDER — FLUTICASONE FUROATE, UMECLIDINIUM BROMIDE AND VILANTEROL TRIFENATATE 100; 62.5; 25 UG/1; UG/1; UG/1
POWDER RESPIRATORY (INHALATION)
COMMUNITY
Start: 2021-05-03

## 2021-05-10 RX ORDER — DICLOFENAC SODIUM 10 MG/G
GEL TOPICAL
COMMUNITY
Start: 2021-04-05

## 2021-05-10 RX ORDER — CYCLOBENZAPRINE HCL 10 MG
TABLET ORAL
COMMUNITY
End: 2024-03-05

## 2021-11-15 ENCOUNTER — HOSPITAL ENCOUNTER (OUTPATIENT)
Dept: RADIOLOGY | Facility: HOSPITAL | Age: 51
Discharge: HOME | End: 2021-11-15
Attending: PAIN MEDICINE
Payer: MEDICARE

## 2021-11-15 DIAGNOSIS — M54.2 CERVICALGIA: ICD-10-CM

## 2021-11-15 PROCEDURE — 72141 MRI NECK SPINE W/O DYE: CPT | Mod: 26,,, | Performed by: RADIOLOGY

## 2021-11-15 PROCEDURE — 72141 MRI CERVICAL SPINE WITHOUT CONTRAST: ICD-10-PCS | Mod: 26,,, | Performed by: RADIOLOGY

## 2021-11-15 PROCEDURE — 72141 MRI NECK SPINE W/O DYE: CPT | Mod: TC

## 2022-12-09 ENCOUNTER — HOSPITAL ENCOUNTER (OUTPATIENT)
Dept: RADIOLOGY | Facility: HOSPITAL | Age: 52
Discharge: HOME | End: 2022-12-09
Attending: INTERNAL MEDICINE
Payer: MEDICARE

## 2022-12-09 DIAGNOSIS — J44.1 COPD EXACERBATION: Primary | ICD-10-CM

## 2022-12-09 DIAGNOSIS — J44.1 COPD EXACERBATION: ICD-10-CM

## 2022-12-09 PROCEDURE — 71046 X-RAY EXAM CHEST 2 VIEWS: CPT | Mod: TC

## 2022-12-15 ENCOUNTER — HOSPITAL ENCOUNTER (OUTPATIENT)
Dept: RADIOLOGY | Facility: HOSPITAL | Age: 52
Discharge: HOME | End: 2022-12-15
Attending: NURSE PRACTITIONER
Payer: MEDICARE

## 2022-12-15 DIAGNOSIS — M54.12 CERVICAL RADICULOPATHY: ICD-10-CM

## 2022-12-15 DIAGNOSIS — M54.2 CERVICALGIA: ICD-10-CM

## 2022-12-15 PROCEDURE — 72141 MRI CERVICAL SPINE WITHOUT CONTRAST: ICD-10-PCS | Mod: 26,,, | Performed by: RADIOLOGY

## 2022-12-15 PROCEDURE — 72141 MRI NECK SPINE W/O DYE: CPT | Mod: TC

## 2022-12-15 PROCEDURE — 72141 MRI NECK SPINE W/O DYE: CPT | Mod: 26,,, | Performed by: RADIOLOGY

## 2022-12-30 ENCOUNTER — HOSPITAL ENCOUNTER (OUTPATIENT)
Dept: RADIOLOGY | Facility: HOSPITAL | Age: 52
Discharge: HOME | End: 2022-12-30
Attending: NURSE PRACTITIONER
Payer: MEDICARE

## 2022-12-30 DIAGNOSIS — R29.2 HYPERREFLEXIA: ICD-10-CM

## 2022-12-30 PROCEDURE — 72146 MRI CHEST SPINE W/O DYE: CPT | Mod: 26,,, | Performed by: RADIOLOGY

## 2022-12-30 PROCEDURE — 72148 MRI LUMBAR SPINE WITHOUT CONTRAST: ICD-10-PCS | Mod: 26,,, | Performed by: RADIOLOGY

## 2022-12-30 PROCEDURE — 72146 MRI THORACIC SPINE WITHOUT CONTRAST: ICD-10-PCS | Mod: 26,,, | Performed by: RADIOLOGY

## 2022-12-30 PROCEDURE — 72148 MRI LUMBAR SPINE W/O DYE: CPT | Mod: TC

## 2022-12-30 PROCEDURE — 72146 MRI CHEST SPINE W/O DYE: CPT | Mod: TC

## 2022-12-30 PROCEDURE — 72148 MRI LUMBAR SPINE W/O DYE: CPT | Mod: 26,,, | Performed by: RADIOLOGY

## 2023-05-01 ENCOUNTER — TELEPHONE (OUTPATIENT)
Dept: NEUROLOGY | Facility: CLINIC | Age: 53
End: 2023-05-01
Payer: MEDICARE

## 2023-05-01 DIAGNOSIS — R20.0 NUMBNESS AND TINGLING IN BOTH HANDS: ICD-10-CM

## 2023-05-01 DIAGNOSIS — R29.2 HYPERREFLEXIA: ICD-10-CM

## 2023-05-01 DIAGNOSIS — M54.2 CERVICALGIA: Primary | ICD-10-CM

## 2023-05-01 DIAGNOSIS — R20.2 NUMBNESS AND TINGLING IN BOTH HANDS: ICD-10-CM

## 2023-05-01 NOTE — TELEPHONE ENCOUNTER
Fax EMG BUE referral received from [facility],  authorize scheduling procedure. Notified patient appointment scheduled for 5/23/2023 at 4:15pm, voiced understanding. Appointment reminder mailed via [place].

## 2023-05-23 ENCOUNTER — PROCEDURE VISIT (OUTPATIENT)
Dept: NEUROLOGY | Facility: CLINIC | Age: 53
End: 2023-05-23
Payer: MEDICARE

## 2023-05-23 DIAGNOSIS — R20.2 NUMBNESS AND TINGLING IN BOTH HANDS: ICD-10-CM

## 2023-05-23 DIAGNOSIS — R29.2 HYPERREFLEXIA: ICD-10-CM

## 2023-05-23 DIAGNOSIS — G56.03 BILATERAL CARPAL TUNNEL SYNDROME: Primary | ICD-10-CM

## 2023-05-23 DIAGNOSIS — M54.2 CERVICALGIA: ICD-10-CM

## 2023-05-23 DIAGNOSIS — R20.0 NUMBNESS AND TINGLING IN BOTH HANDS: ICD-10-CM

## 2023-05-23 PROCEDURE — 99999 PR STA SHADOW: CPT | Mod: PBBFAC,,, | Performed by: PSYCHIATRY & NEUROLOGY

## 2023-05-23 PROCEDURE — 95911 NRV CNDJ TEST 9-10 STUDIES: CPT | Mod: S$PBB | Performed by: PSYCHIATRY & NEUROLOGY

## 2023-05-23 PROCEDURE — 99999 PR STA SHADOW: ICD-10-PCS | Mod: PBBFAC,,, | Performed by: PSYCHIATRY & NEUROLOGY

## 2023-05-23 PROCEDURE — 95886 MUSC TEST DONE W/N TEST COMP: CPT | Mod: PBBFAC | Performed by: PSYCHIATRY & NEUROLOGY

## 2023-05-23 NOTE — PROCEDURES
EMG W/ ULTRASOUND AND NERVE CONDUCTION TEST 2 Extremities    Date/Time: 5/23/2023 4:15 PM  Performed by: Dale Naylor MD  Authorized by: Dale Naylor MD     REPORT OF EMG and NERVE CONDUCTION STUDY    Name: Jena Harkins  Date of Study:  5/23/2023  Referring Physician:  Dr. Jarad Aguillon, WJ  Test Performed by:  MD Cyndy  Full Values Attached  Informed Consent Scanned.   No anesthesia used.   Amount of Blood Loss: none. The patient tolerated this procedure well.       Informed consent was obtained prior to performing this study. Two patient identifiers were confirmed with the patient prior to performing this study. A time out to determine correct patient and and agreement on procedure performed was conducted prior to the concentric needle examination.    Reason for the study:  numb hands. Inquiry, CTS?      Findings:   Nerve conduction studies of the bilateral median (motor and sensory)nerves, bilateral ulnar (motor and sensory) nerves, and bilateral radial sensory nerve were performed.  Right median motor latency was prolonged to 4.8ms. Medial palm to wrist latency was prolonged to 3.1ms on the right and 3.0ms on the left.  Otherwise, amplitudes, distal latencies, conduction velocities, and F-waves were normal.   EMG of selected muscles of the bilateral arms were performed as indicated on the attached sheets.Insertional activity was normal without fasciculation or fibrillation, normal sized and phasia of motor units.      Impression:  Abnormal Study secondary to the Presence of:    Mild Bilateral Carpal Tunnel Syndrome    Dale Naylor M.D. [facility].     A copy of this EMG/NCS report will be sent to Dr Aguillon . Thanks for sending this patient for EMG/NCS. The patient plans to await further recommendations from you regarding the above findings.

## 2024-02-01 ENCOUNTER — OFFICE VISIT (OUTPATIENT)
Dept: ORTHOPEDICS | Facility: CLINIC | Age: 54
End: 2024-02-01
Payer: MEDICARE

## 2024-02-01 VITALS
HEART RATE: 82 BPM | HEIGHT: 64 IN | RESPIRATION RATE: 16 BRPM | SYSTOLIC BLOOD PRESSURE: 116 MMHG | BODY MASS INDEX: 21.54 KG/M2 | WEIGHT: 126.19 LBS | DIASTOLIC BLOOD PRESSURE: 72 MMHG

## 2024-02-01 DIAGNOSIS — G56.01 RIGHT CARPAL TUNNEL SYNDROME: Primary | ICD-10-CM

## 2024-02-01 PROCEDURE — 99203 OFFICE O/P NEW LOW 30 MIN: CPT | Mod: S$PBB | Performed by: PHYSICIAN ASSISTANT

## 2024-02-01 PROCEDURE — 99999 PR PBB SHADOW E&M-EST. PATIENT-LVL III: CPT | Mod: PBBFAC,,, | Performed by: PHYSICIAN ASSISTANT

## 2024-02-01 PROCEDURE — 99999 PR STA SHADOW: CPT | Mod: PBBFAC,,, | Performed by: PHYSICIAN ASSISTANT

## 2024-02-01 PROCEDURE — 99999PBSHW PR PBB SHADOW TECHNICAL ONLY FILED TO HB: Mod: PBBFAC,,,

## 2024-02-01 PROCEDURE — 20526 THER INJECTION CARP TUNNEL: CPT | Mod: S$PBB | Performed by: PHYSICIAN ASSISTANT

## 2024-02-01 PROCEDURE — 20526 THER INJECTION CARP TUNNEL: CPT | Mod: PBBFAC | Performed by: PHYSICIAN ASSISTANT

## 2024-02-01 PROCEDURE — 99213 OFFICE O/P EST LOW 20 MIN: CPT | Mod: PBBFAC | Performed by: PHYSICIAN ASSISTANT

## 2024-02-01 RX ORDER — DEXAMETHASONE SODIUM PHOSPHATE 4 MG/ML
4 INJECTION, SOLUTION INTRA-ARTICULAR; INTRALESIONAL; INTRAMUSCULAR; INTRAVENOUS; SOFT TISSUE ONCE
Status: COMPLETED | OUTPATIENT
Start: 2024-02-01 | End: 2024-02-01

## 2024-02-01 RX ADMIN — DEXAMETHASONE SODIUM PHOSPHATE 4 MG: 4 INJECTION, SOLUTION INTRAMUSCULAR; INTRAVENOUS at 08:02

## 2024-02-01 NOTE — PROGRESS NOTES
Subjective:      Patient ID: Steph Harkins is a 53 y.o. female.    Chief Complaint: Pain and Numbness of the Right Hand    Review of patient's allergies indicates:   Allergen Reactions    Bee sting [allergen ext-venom-honey bee] Swelling    Penicillins Swelling    Peas Swelling      52 yo RHD F presents to clinic with c/o intermittent bilateral hand numbness/tingling x years.  No injury or trauma.  Worse at night and when she wakes up in the morning.  She has tried wrist braces at night with little improvement of symptoms.  Recent EMG showed mild bilateral carpal tunnel syndrome.    She does also have history of neck pain, sees pain management in [place].            Review of Systems   Constitutional: Negative for chills, diaphoresis and fever.   HENT:  Negative for congestion, ear discharge and ear pain.    Eyes:  Negative for blurred vision, discharge, double vision and pain.   Cardiovascular:  Negative for chest pain, claudication and cyanosis.   Respiratory:  Negative for cough, hemoptysis and shortness of breath.    Endocrine: Negative for cold intolerance and heat intolerance.   Skin:  Negative for color change, dry skin, itching and rash.   Musculoskeletal:  Positive for neck pain. Negative for arthritis, back pain, falls, gout, joint pain, joint swelling and muscle weakness.   Gastrointestinal:  Negative for abdominal pain and change in bowel habit.   Neurological:  Positive for numbness and paresthesias. Negative for brief paralysis, disturbances in coordination and dizziness.   Psychiatric/Behavioral:  Negative for altered mental status and depression.          Objective:          General    Constitutional: She is oriented to person, place, and time. She appears well-developed and well-nourished. No distress.   HENT:   Head: Atraumatic.   Eyes: EOM are normal. Right eye exhibits no discharge. Left eye exhibits no discharge.   Cardiovascular:  Normal rate.            Pulmonary/Chest: Effort normal. No  respiratory distress.   Abdominal: Soft.   Neurological: She is alert and oriented to person, place, and time.   Psychiatric: She has a normal mood and affect. Her behavior is normal.             Right Hand/Wrist Exam     Inspection   Scars: Wrist - absent Hand -  absent  Effusion: Wrist - absent Hand -  absent  Bruising: Wrist - absent Hand -  absent  Deformity: Wrist - deformity Hand -  deformity    Range of Motion     Wrist   Extension:  normal   Flexion:  normal   Pronation:  normal   Supination:  normal     Tests   Phalens sign: positive  Tinel's sign (median nerve): positive  Finkelstein's test: negative  Carpal Tunnel Compression Test: positive  Cubital Tunnel Compression Test: negative      Other     Neuorologic Exam    Median Distribution: normal  Ulnar Distribution: normal  Radial Distribution: normal      Left Hand/Wrist Exam     Inspection   Scars: Wrist - absent Hand -  absent  Effusion: Wrist - absent Hand -  absent  Bruising: Wrist - absent Hand -  absent  Deformity: Wrist - absent Hand -  absent    Range of Motion     Wrist   Extension:  normal   Flexion:  normal   Pronation:  normal   Supination:  normal     Tests   Phalens Sign: negative  Tinel's sign (median nerve): negative  Finkelstein's test: negative  Carpal Tunnel Compression Test: negative  Cubital Tunnel Compression Test: negative      Other     Sensory Exam  Median Distribution: normal  Ulnar Distribution: normal  Radial Distribution: normal      Right Elbow Exam     Tests   Tinel's sign (cubital tunnel): negative      Left Elbow Exam     Tests   Tinel's sign (cubital tunnel): negative        Vascular Exam       Capillary Refill  Right Hand: normal capillary refill  Left Hand: normal capillary refill                  Assessment:         EMG BUE 5/23/23:  Mild Bilateral Carpal Tunnel Syndrome       Encounter Diagnosis   Name Primary?    Right carpal tunnel syndrome Yes    Right carpal tunnel syndrome  -     dexAMETHasone injection 4  mg               Plan:         I made the decision to obtain old records of the patient including previous notes and imaging.     The total face-to-face encounter time with this patient was 30 minutes and greater than 50% of of the encounter time was spent counseling the patient, coordinating care, and education regarding the pathology of his/her diagnosis. We have discussed a variety of treatment options including medications, bracing, nerve glide exercises, injections, occupational therapy and surgery. Pt is requesting right carpal tunnel injection today.  Today, the patient chooses  a CSI and understands a minimum of 3 months time must lapse after injection, prior to a surgical procedure due to increased risk of infection.     PROCEDURE:  I have explained the risks, benefits, and alternatives of the procedure in detail.  The patient voices understanding and all questions have been answered.  The patient agrees to proceed as planned. The palmaris longus tendon was identified and marked and so was the distal wrist crease After I performed a sterile prep of the skin in the normal fashion the right carpal tunnel is injected from the volar approach using a 25 gauge needle with a combination of 1cc 1% plain lidocaine and 4 mg of dexamethasone.  The patient is cautioned and immediate relief of pain is secondary to the local anesthetic and will be temporary.  After the anesthetic wears off there may be a increase in pain that may last for a few hours or a few days and they should use ice to help alleviate this flare up of pain. Patient tolerated the procedure well. The patient has been asked to report to us any redness, swelling, inflammation, or fevers. The patient has been asked to restrict the use of the right upper extremity for the next 24 hours.     2. Nerve glide exercises.  3. Wrist braces to be worn while sleeping and as needed during the day.  4. Ice compress to the affected area 2-3x a day for 15-20 minutes as  needed for pain management.  5. RTC in 6 weeks for follow-up, sooner if needed.      Patient voices understanding of and agreement with treatment plan. All of the patient's questions were answered and the patient will contact us if she has any questions or concerns in the interim.

## 2024-02-19 DIAGNOSIS — M85.851 OSTEOPENIA OF NECKS OF BOTH FEMURS: ICD-10-CM

## 2024-02-19 DIAGNOSIS — M85.852 OSTEOPENIA OF NECKS OF BOTH FEMURS: ICD-10-CM

## 2024-02-19 RX ORDER — ALENDRONATE SODIUM 70 MG/1
70 TABLET ORAL
Qty: 4 TABLET | Refills: 11 | Status: SHIPPED | OUTPATIENT
Start: 2024-02-19 | End: 2025-02-18

## 2024-02-19 NOTE — TELEPHONE ENCOUNTER
Refill Routing Note   Medication(s) are not appropriate for processing by [facility] for the following reason(s):        Outside of protocol    [facility] action(s):  Route               Appointments  past 12m or future 3m with PCP    Date Provider   Last Visit   5/10/2021 Barbara Kruse MD   Next Visit   Visit date not found Barbara Kruse MD   ED visits in past 90 days: 0        Note composed:9:53 AM 02/19/2024

## 2024-02-27 ENCOUNTER — HOSPITAL ENCOUNTER (OUTPATIENT)
Dept: RADIOLOGY | Facility: HOSPITAL | Age: 54
Discharge: HOME | End: 2024-02-27
Attending: PAIN MEDICINE
Payer: MEDICARE

## 2024-02-27 DIAGNOSIS — M25.521 RIGHT ELBOW PAIN: ICD-10-CM

## 2024-02-27 DIAGNOSIS — M25.521 RIGHT ELBOW PAIN: Primary | ICD-10-CM

## 2024-02-27 PROCEDURE — 73070 X-RAY EXAM OF ELBOW: CPT | Mod: 26,RT,, | Performed by: RADIOLOGY

## 2024-02-27 PROCEDURE — 73070 X-RAY EXAM OF ELBOW: CPT | Mod: TC,RT

## 2024-03-05 ENCOUNTER — OFFICE VISIT (OUTPATIENT)
Dept: FAMILY MEDICINE | Facility: CLINIC | Age: 54
End: 2024-03-05
Payer: MEDICARE

## 2024-03-05 VITALS
SYSTOLIC BLOOD PRESSURE: 110 MMHG | RESPIRATION RATE: 17 BRPM | HEIGHT: 64 IN | HEART RATE: 76 BPM | BODY MASS INDEX: 21.34 KG/M2 | DIASTOLIC BLOOD PRESSURE: 70 MMHG | WEIGHT: 125 LBS | OXYGEN SATURATION: 95 %

## 2024-03-05 DIAGNOSIS — M47.896 OTHER OSTEOARTHRITIS OF SPINE, LUMBAR REGION: ICD-10-CM

## 2024-03-05 DIAGNOSIS — M81.0 OSTEOPOROSIS, POSTMENOPAUSAL: ICD-10-CM

## 2024-03-05 DIAGNOSIS — E78.5 DYSLIPIDEMIA: ICD-10-CM

## 2024-03-05 DIAGNOSIS — F17.200 SMOKER: ICD-10-CM

## 2024-03-05 DIAGNOSIS — M47.894 OTHER OSTEOARTHRITIS OF SPINE, THORACIC REGION: ICD-10-CM

## 2024-03-05 DIAGNOSIS — F41.1 GAD (GENERALIZED ANXIETY DISORDER): Primary | ICD-10-CM

## 2024-03-05 DIAGNOSIS — Z12.11 COLON CANCER SCREENING: ICD-10-CM

## 2024-03-05 DIAGNOSIS — E55.9 VITAMIN D DEFICIENCY DISEASE: ICD-10-CM

## 2024-03-05 DIAGNOSIS — M47.892 OTHER OSTEOARTHRITIS OF SPINE, CERVICAL REGION: ICD-10-CM

## 2024-03-05 DIAGNOSIS — Z12.31 BREAST CANCER SCREENING BY MAMMOGRAM: ICD-10-CM

## 2024-03-05 DIAGNOSIS — J43.9 PULMONARY EMPHYSEMA, UNSPECIFIED EMPHYSEMA TYPE: ICD-10-CM

## 2024-03-05 PROCEDURE — 99999 PR STA SHADOW: CPT | Mod: PBBFAC,,, | Performed by: FAMILY MEDICINE

## 2024-03-05 PROCEDURE — 99214 OFFICE O/P EST MOD 30 MIN: CPT | Mod: PBBFAC | Performed by: FAMILY MEDICINE

## 2024-03-05 PROCEDURE — 99999 PR PBB SHADOW E&M-EST. PATIENT-LVL IV: CPT | Mod: PBBFAC,,, | Performed by: FAMILY MEDICINE

## 2024-03-05 PROCEDURE — 99204 OFFICE O/P NEW MOD 45 MIN: CPT | Mod: S$PBB | Performed by: FAMILY MEDICINE

## 2024-03-05 RX ORDER — LOVASTATIN 20 MG/1
TABLET ORAL
COMMUNITY
Start: 2023-11-15 | End: 2024-04-05 | Stop reason: SDUPTHER

## 2024-03-05 NOTE — PROGRESS NOTES
Subjective:       Patient ID: Steph Harkins is a 53 y.o. female.    Chief Complaint: Establish Care (Pt here to [facility]. )    Pt is a 53 y.o. female who presents for evaluation and management of   Encounter Diagnoses   Name Primary?    RENITA (generalized anxiety disorder) Yes    Other osteoarthritis of spine, cervical region     Other osteoarthritis of spine, thoracic region     Other osteoarthritis of spine, lumbar region     Osteoporosis, postmenopausal     Smoker     Pulmonary emphysema, unspecified emphysema type     Dyslipidemia     Vitamin D deficiency disease     Breast cancer screening by mammogram    New to me She is seeing [facility] for her anxiety. Seeing pain mngt in [place] for her spine issues. She is on long term opioids for that. She would like to find a pain mngt doctor closer to home     Doing well on current meds. Denies any side effects. Prevention is up to date.  Review of Systems   Constitutional:  Negative for chills and fever.   Respiratory:  Negative for shortness of breath.    Cardiovascular:  Negative for chest pain and palpitations.   Gastrointestinal:  Positive for constipation. Negative for abdominal pain, blood in stool and nausea.   Genitourinary:  Negative for difficulty urinating.   Musculoskeletal:  Positive for back pain and neck pain.   Neurological:  Positive for numbness.        Numbness hands. Sees [facility]    Psychiatric/Behavioral:  Negative for dysphoric mood, sleep disturbance and suicidal ideas. The patient is not nervous/anxious.        Objective:      Physical Exam  Constitutional:       Appearance: She is well-developed.   HENT:      Head: Normocephalic and atraumatic.      Right Ear: External ear normal.      Left Ear: External ear normal.      Nose: Nose normal.   Eyes:      Pupils: Pupils are equal, round, and reactive to light.   Neck:      Thyroid: No thyromegaly.      Vascular: No JVD.      Trachea: No tracheal deviation.   Cardiovascular:      Rate and Rhythm: Normal  rate.      Heart sounds: Normal heart sounds. No murmur heard.  Pulmonary:      Effort: Pulmonary effort is normal. No respiratory distress.      Breath sounds: Normal breath sounds. No wheezing or rales.   Chest:      Chest wall: No tenderness.   Abdominal:      General: Bowel sounds are normal. There is no distension.      Palpations: Abdomen is soft. There is no mass.      Tenderness: There is no abdominal tenderness. There is no guarding or rebound.   Musculoskeletal:         General: No tenderness. Normal range of motion.      Cervical back: Normal range of motion and neck supple.   Lymphadenopathy:      Cervical: No cervical adenopathy.   Skin:     General: Skin is warm and dry.      Coloration: Skin is not pale.      Findings: No erythema or rash.   Neurological:      Mental Status: She is alert and oriented to person, place, and time.      Cranial Nerves: No cranial nerve deficit.      Motor: No abnormal muscle tone.      Coordination: Coordination normal.      Deep Tendon Reflexes: Reflexes are normal and symmetric. Reflexes normal.   Psychiatric:         Behavior: Behavior normal.         Thought Content: Thought content normal.         Judgment: Judgment normal.         Assessment:       1. RENITA (generalized anxiety disorder)    2. Other osteoarthritis of spine, cervical region    3. Other osteoarthritis of spine, thoracic region    4. Other osteoarthritis of spine, lumbar region    5. Osteoporosis, postmenopausal    6. Smoker    7. Pulmonary emphysema, unspecified emphysema type    8. Dyslipidemia    9. Vitamin D deficiency disease    10. Breast cancer screening by mammogram        Plan:   1. RENITA (generalized anxiety disorder)  Overview:  Sees psychiatrist at [facility] ---lexapro   Trazodone prn for sleep         2. Other osteoarthritis of spine, cervical region  -     Ambulatory referral/consult to Pain Clinic; Future; Expected date: 03/12/2024    3. Other osteoarthritis of spine, thoracic  region  -     Ambulatory referral/consult to Pain Clinic; Future; Expected date: 03/12/2024    4. Other osteoarthritis of spine, lumbar region  -     Ambulatory referral/consult to Pain Clinic; Future; Expected date: 03/12/2024    5. Osteoporosis, postmenopausal  -     DXA Bone Density Axial Skeleton 1 or more sites; Future; Expected date: 03/05/2024  -     Vitamin D; Future; Expected date: 03/06/2024    6. Smoker  Overview:  40 pk yr hx   Failed cessation attempts, multiple         Orders:  -     Ambulatory referral/consult to Smoking Cessation Program; Future; Expected date: 03/12/2024  -     CBC Auto Differential; Future; Expected date: 03/06/2024    7. Pulmonary emphysema, unspecified emphysema type  Overview:  [facility]   Uses albuterol about 4x/ week   Sees Dr. Davies           8. Dyslipidemia  Overview:  Lab Results   Component Value Date    LDLCALC 122.2 08/28/2020     Lovastatin     Orders:  -     Comprehensive Metabolic Panel; Future; Expected date: 03/06/2024  -     Lipid Panel; Future; Expected date: 03/06/2024  -     TSH; Future; Expected date: 03/06/2024    9. Vitamin D deficiency disease  -     Vitamin D; Future; Expected date: 03/06/2024    10. Breast cancer screening by mammogram  -     Mammo Digital Screening Bilat; Future; Expected date: 03/05/2024    Will refer to pain mngt but I doubt Dr. Roth will be interested in continuing her long term opioids. He may want to wean her off. Pt made aware.     RTC 6 months but that may change pending lab results       No follow-ups on file.

## 2024-03-07 ENCOUNTER — CLINICAL SUPPORT (OUTPATIENT)
Dept: FAMILY MEDICINE | Facility: CLINIC | Age: 54
End: 2024-03-07
Payer: MEDICARE

## 2024-03-07 ENCOUNTER — OFFICE VISIT (OUTPATIENT)
Dept: PAIN MEDICINE | Facility: CLINIC | Age: 54
End: 2024-03-07
Payer: MEDICARE

## 2024-03-07 VITALS
SYSTOLIC BLOOD PRESSURE: 110 MMHG | DIASTOLIC BLOOD PRESSURE: 66 MMHG | BODY MASS INDEX: 21.05 KG/M2 | OXYGEN SATURATION: 96 % | HEART RATE: 60 BPM | RESPIRATION RATE: 18 BRPM | WEIGHT: 123.31 LBS | HEIGHT: 64 IN

## 2024-03-07 DIAGNOSIS — M54.50 CHRONIC BILATERAL LOW BACK PAIN WITHOUT SCIATICA: ICD-10-CM

## 2024-03-07 DIAGNOSIS — M47.896 OTHER OSTEOARTHRITIS OF SPINE, LUMBAR REGION: ICD-10-CM

## 2024-03-07 DIAGNOSIS — G89.4 CHRONIC PAIN SYNDROME: Primary | ICD-10-CM

## 2024-03-07 DIAGNOSIS — M54.2 CHRONIC NECK PAIN: ICD-10-CM

## 2024-03-07 DIAGNOSIS — G89.29 CHRONIC BILATERAL LOW BACK PAIN WITHOUT SCIATICA: ICD-10-CM

## 2024-03-07 DIAGNOSIS — M47.894 OTHER OSTEOARTHRITIS OF SPINE, THORACIC REGION: ICD-10-CM

## 2024-03-07 DIAGNOSIS — E55.9 VITAMIN D DEFICIENCY DISEASE: ICD-10-CM

## 2024-03-07 DIAGNOSIS — M47.892 OTHER OSTEOARTHRITIS OF SPINE, CERVICAL REGION: ICD-10-CM

## 2024-03-07 DIAGNOSIS — G89.29 CHRONIC NECK PAIN: ICD-10-CM

## 2024-03-07 DIAGNOSIS — F17.200 SMOKER: ICD-10-CM

## 2024-03-07 DIAGNOSIS — M81.0 OSTEOPOROSIS, POSTMENOPAUSAL: ICD-10-CM

## 2024-03-07 DIAGNOSIS — E78.5 DYSLIPIDEMIA: ICD-10-CM

## 2024-03-07 LAB
25(OH)D3+25(OH)D2 SERPL-MCNC: 38 NG/ML (ref 30–96)
ALBUMIN SERPL BCP-MCNC: 4.2 G/DL (ref 3.5–5.2)
ALP SERPL-CCNC: 66 U/L (ref 55–135)
ALT SERPL W/O P-5'-P-CCNC: 12 U/L (ref 10–44)
ANION GAP SERPL CALC-SCNC: 9 MMOL/L (ref 8–16)
AST SERPL-CCNC: 12 U/L (ref 10–40)
BASOPHILS # BLD AUTO: 0.01 K/UL (ref 0–0.2)
BASOPHILS NFR BLD: 0.1 % (ref 0–1.9)
BILIRUB SERPL-MCNC: 0.5 MG/DL (ref 0.1–1)
BUN SERPL-MCNC: 12 MG/DL (ref 6–20)
CALCIUM SERPL-MCNC: 9.5 MG/DL (ref 8.7–10.5)
CHLORIDE SERPL-SCNC: 104 MMOL/L (ref 95–110)
CHOLEST SERPL-MCNC: 169 MG/DL (ref 120–199)
CHOLEST/HDLC SERPL: 3.8 {RATIO} (ref 2–5)
CO2 SERPL-SCNC: 28 MMOL/L (ref 23–29)
CREAT SERPL-MCNC: 0.8 MG/DL (ref 0.5–1.4)
DIFFERENTIAL METHOD BLD: ABNORMAL
EOSINOPHIL # BLD AUTO: 0.1 K/UL (ref 0–0.5)
EOSINOPHIL NFR BLD: 0.4 % (ref 0–8)
ERYTHROCYTE [DISTWIDTH] IN BLOOD BY AUTOMATED COUNT: 13.3 % (ref 11.5–14.5)
EST. GFR  (NO RACE VARIABLE): >60 ML/MIN/1.73 M^2
GLUCOSE SERPL-MCNC: 87 MG/DL (ref 70–110)
HCT VFR BLD AUTO: 45 % (ref 37–48.5)
HDLC SERPL-MCNC: 45 MG/DL (ref 40–75)
HDLC SERPL: 26.6 % (ref 20–50)
HGB BLD-MCNC: 14.6 G/DL (ref 12–16)
IMM GRANULOCYTES # BLD AUTO: 0.11 K/UL (ref 0–0.04)
IMM GRANULOCYTES NFR BLD AUTO: 0.8 % (ref 0–0.5)
LDLC SERPL CALC-MCNC: 102 MG/DL (ref 63–159)
LYMPHOCYTES # BLD AUTO: 4 K/UL (ref 1–4.8)
LYMPHOCYTES NFR BLD: 29.5 % (ref 18–48)
MCH RBC QN AUTO: 31.7 PG (ref 27–31)
MCHC RBC AUTO-ENTMCNC: 32.4 G/DL (ref 32–36)
MCV RBC AUTO: 98 FL (ref 82–98)
MONOCYTES # BLD AUTO: 1 K/UL (ref 0.3–1)
MONOCYTES NFR BLD: 7.3 % (ref 4–15)
NEUTROPHILS # BLD AUTO: 8.3 K/UL (ref 1.8–7.7)
NEUTROPHILS NFR BLD: 61.9 % (ref 38–73)
NONHDLC SERPL-MCNC: 124 MG/DL
NRBC BLD-RTO: 0 /100 WBC
PLATELET # BLD AUTO: 464 K/UL (ref 150–450)
PMV BLD AUTO: 10 FL (ref 9.2–12.9)
POTASSIUM SERPL-SCNC: 4.3 MMOL/L (ref 3.5–5.1)
PROT SERPL-MCNC: 7.5 G/DL (ref 6–8.4)
RBC # BLD AUTO: 4.6 M/UL (ref 4–5.4)
SODIUM SERPL-SCNC: 141 MMOL/L (ref 136–145)
TRIGL SERPL-MCNC: 110 MG/DL (ref 30–150)
TSH SERPL DL<=0.005 MIU/L-ACNC: 1.93 UIU/ML (ref 0.4–4)
WBC # BLD AUTO: 13.47 K/UL (ref 3.9–12.7)

## 2024-03-07 PROCEDURE — 99204 OFFICE O/P NEW MOD 45 MIN: CPT | Mod: S$PBB | Performed by: ANESTHESIOLOGY

## 2024-03-07 PROCEDURE — 84443 ASSAY THYROID STIM HORMONE: CPT | Performed by: FAMILY MEDICINE

## 2024-03-07 PROCEDURE — 36415 COLL VENOUS BLD VENIPUNCTURE: CPT | Mod: PBBFAC

## 2024-03-07 PROCEDURE — 99999PBSHW PR PBB SHADOW TECHNICAL ONLY FILED TO HB: Mod: PBBFAC,,,

## 2024-03-07 PROCEDURE — 85025 COMPLETE CBC W/AUTO DIFF WBC: CPT | Performed by: FAMILY MEDICINE

## 2024-03-07 PROCEDURE — 80053 COMPREHEN METABOLIC PANEL: CPT | Performed by: FAMILY MEDICINE

## 2024-03-07 PROCEDURE — 99999 PR STA SHADOW: CPT | Mod: PBBFAC,,, | Performed by: ANESTHESIOLOGY

## 2024-03-07 PROCEDURE — 99999 PR PBB SHADOW E&M-EST. PATIENT-LVL IV: CPT | Mod: PBBFAC,,, | Performed by: ANESTHESIOLOGY

## 2024-03-07 PROCEDURE — 99999 PR STA SHADOW: CPT | Mod: PBBFAC,,,

## 2024-03-07 PROCEDURE — 99214 OFFICE O/P EST MOD 30 MIN: CPT | Mod: PBBFAC | Performed by: ANESTHESIOLOGY

## 2024-03-07 PROCEDURE — 82306 VITAMIN D 25 HYDROXY: CPT | Performed by: FAMILY MEDICINE

## 2024-03-07 PROCEDURE — 80061 LIPID PANEL: CPT | Performed by: FAMILY MEDICINE

## 2024-03-07 NOTE — PROGRESS NOTES
[facility] New Patient Evaluation    Steph Harkins  : 1970  Date: 3/7/2024     CHIEF COMPLAINT:  No chief complaint on file.    Referring Physician: hCi Toledo MD  Primary Care Physician: Iglesia Toledo PA    HPI:  This is a 53 y.o. female with a chief complaint of No chief complaint on file.  . The patient has Past medical history/Past surgical history of anxiety depression, COPD, hyperlipidemia, tobacco use disorder, osteoporosis on Fosamax, wedge compression deformity of T12, compression fracture of the L1 tobacco use disorder, chronic neck pain, bilateral carpal tunnel syndrome    Patient was previously seen by neurosurgery team at [place] for chronic neck and back pain, received cervical epidural steroid injection, carpal tunnel injection with limited relief, last seen 2024.  Patient was evaluated and referred by primary care provider for neck and back pain.    Diabetic: {GAYes/No/NA:23858}    {Anticogualtion drugs:73230}    Allergy To Iodine: {GAYes/No/NA:46763}    Currently on Antibiotic: {GAYes/No/NA:22908}    Current Description of Pain Symptoms:    History of Recent Fall or Trauma: {GAYes/No/NA:75787}   Onset: Chronic, started ***  Pain Location: ***  Radiates/associated symptoms: ***.   Pain is Getting worse over the last *** months    The pain is described as {Desc; pain character:27284}.   Exacerbating factors: {Causes; Pain:11019}.   Mitigating factors ***.   Symptoms interfere with daily activity, sleeping, and ***.   The patient feels like symptoms have been {IUW:16113}.   Patient {Denies / Reports:11181} {RED FLAGS:70695}.    Pain score:   Current: {PAIN 0-10:29374}/10  Best: {PAIN 0-10:94450}/10  Worst: {PAIN 0-10:55489}/10    Current pain medications:      EScitalopram oxalate (LEXAPRO) 10 MG tablet, t    HYDROcodone-acetaminophen (NORCO)  mg per tablet, b.i.d. by Janet Joshi     pregabalin (LYRICA) 100 MG capsule, b.i.d. by Janet KINSEY  Adi     Current Narcotics/Opioid /benzo Medications:  Opioids- Hydrocodone with Acetaminophen (Norco)  Benzodiazepines: No    UDS:  NA    PDMP:  Reviewed and consistent with medication use as prescribed.     Previous Chronic Pain Treatment History:  Physical Therapy/HEP/Physician Lead Exercise Program:  Over the past 12 months, Patient has done  *** sessions.  PT response: {PT response:99611} Helpful.   Dates of the PT sessions: ***, ***  Is patient participating in home exercise program (HEP): Yes.    Non-interventional Pain Therapy:  []Chiropractor.   []Acupuncture/Dry needle.  []TENS unit.  []Heat/ICE.  []Back Brace.    Medications previously tried:  NSAIDs: {GANSIAD:28677}  Topical Agent: {GAYes/No/NA:64383}  TCA/SSRI/SNRI: {GATCA/SSRI/SNRI:40657}  Anti-convulsants: {GAAnticonvulsants:60403}  Muscle Relaxants: {GAmuscle Relaxant:49382}  Opioids- {GAopioid:63385}.    Interventional Pain Procedures:  ***    Previous spine/Relevant joint surgery:  ***  Surgical History:   has a past surgical history that includes  section; Hysterectomy; and Oophorectomy.  Medical History:   has a past medical history of Asthma, Chronic back pain, and Migraine.  Family History:  family history includes Breast cancer in her maternal aunt; Cancer in her maternal aunt and maternal cousin; Heart disease in her maternal uncle and mother; Hypertension in her mother; No Known Problems in her father.  Allergies:  Bee sting [allergen ext-venom-honey bee], Codeine, Penicillins, and Peas   Social History/SUBSTANCE ABUSE HISTORY:  Personal history of substance abuse: No   reports that she has been smoking cigarettes. She started smoking about 40 years ago. She has a 40.2 pack-year smoking history. She has never used smokeless tobacco. She reports current alcohol use. She reports current drug use. Drug: Marijuana.  LABS:  CBC  Lab Results   Component Value Date    WBC 7.95 2019    HGB 14.0 2019    HCT 42.7 2019  "    Coagulation Profile   Lab Results   Component Value Date     03/27/2019     No results found for: "PT", "PTT", "INR"  CMP:  BMP  Lab Results   Component Value Date     08/28/2020    K 4.3 08/28/2020     08/28/2020    CO2 28 08/28/2020    BUN 10 08/28/2020    CREATININE 0.7 08/28/2020    CALCIUM 8.9 08/28/2020    ANIONGAP 8 08/28/2020     Lab Results   Component Value Date    ALT 8 (L) 08/28/2020    AST 14 08/28/2020    ALKPHOS 80 08/28/2020    BILITOT 0.3 08/28/2020     HGBA1C:  No results found for: "LABA1C", "HGBA1C"    ROS:    Review of Systems   GENERAL:  No weight loss, malaise or fevers.  HEENT:   No recent changes in vision or hearing  NECK:  Negative for lumps, no difficulty with swallowing.  RESPIRATORY:  Negative for cough, wheezing or shortness of breath, patient denies any recent URI.  CARDIOVASCULAR:  Negative for chest pain, leg swelling or palpitations.  GI:  Negative for abdominal discomfort, blood in stools or black stools or change in bowel habits.  MUSCULOSKELETAL:  See HPI.  SKIN:  Negative for lesions, rash, and itching.  PSYCH:  No mood disorder or recent psychosocial stressors.   HEMATOLOGY/LYMPHOLOGY:  See the blood thinner sectioned in HPI.  NEURO:  See HPI  All other reviewed and negative other than HPI.    PHYSICAL EXAM:  VITALS: There were no vitals taken for this visit.  There is no height or weight on file to calculate BMI.  GENERAL: Well appearing, in no acute distress, alert and oriented x3, answers questions appropriately.   PSYCH: Flat affect.  SKIN: Skin color, texture, turgor normal, no rashes or lesions.  HEAD/FACE:  Normocephalic, atraumatic. Cranial nerves grossly intact.  CV: Regular rate  PULM: No evidence of respiratory difficulty, symmetric chest rise.  GI:  Soft and non-Distended.  NECK: (***) pain to palpation over the cervical paraspinous muscles. Spurling: ***. (***) pain with neck flexion, extension, or lateral flexion, Muscle strength in RT UE " ***/5 and Left UE ***/5, Hand  5/5 bilaterally   BACK/SIJ/HIP:  Lumbar Spine Exam:       Inspection: No erythema, bruising.       Palpation: (***) TTP of lumbar paraspinals bilaterally      ROM:  Limited in flexion, extension, lateral bending.       (***) Facet loading bilaterally      (***) Straight Leg Raise bilaterally      (***) JUANCARLOS bilaterally, Tenderness over the PSIS, Yeoman test  Hip Exam:      Inspection: No gross deformity or apparent leg length discrepancy      Palpation:  No TTP to bilateral greater trochanteric bursas.       ROM:  No limitation or pain in internal rotation, external rotation b/l  Neurologic Exam:     Alert. Speech is fluent and appropriate.     Strength: ***/5 in *** hip flexion and knee extension, otherwise 5/5 throughout bilateral lower extremities     Sensation:  Grossly intact to light touch in bilateral lower extremities     Tone: No abnormality appreciated in bilateral lower extremities  EXTREMITIES: Peripheral joint ROM is full and pain free without obvious instability or laxity in all four extremities. No deformities, edema, or skin discoloration.     MUSCULOSKELETAL: Shoulder, hip, and knee provocative maneuvers are negative.  Bilateral upper and lower extremity strength is normal and symmetric.  No atrophy or tone abnormalities are noted.    NEURO: Bilateral upper and lower extremity coordination and muscle stretch reflexes are physiologic and symmetric.  Plantar response are downgoing. No clonus.  No loss of sensation is noted.    GAIT: ***    DIAGNOSTIC STUDIES AND MEDICAL RECORDS REVIEW:      MRI CERVICAL SPINE WITHOUT CONTRAST  C2-C3: No central spinal canal or foraminal stenosis.     C3-C4: Broad-based disc bulging with uncovertebral and facet joint hypertrophy results in mild central spinal canal stenosis with mild narrowing of the right neural foramen and moderate narrowing the left neural foramen.  Narrowed AP canal diameter measures 9.9 mm.     C4-C5:  Broad-based disc bulging with uncovertebral and facet joint hypertrophy results in mild central spinal canal stenosis with moderate bilateral neural foraminal narrowing.  Narrowed AP canal diameter measures 9.3 mm.     C5-C6: Broad-based disc bulging with uncovertebral and facet joint hypertrophy results in mild central spinal canal stenosis with mild bilateral neural foraminal narrowing.  Narrowed AP canal diameter measures 8.5 mm.     C6-C7: No central spinal canal or foraminal stenosis.     C7-T1: No central spinal canal or foraminal stenosis.     Impression:     Multilevel degenerative disc disease from C3 through C6 resulting in mild central spinal canal stenosis with mild to moderate neural foraminal narrowing.    MRI THORACIC SPINE WITHOUT CONTRAST  Mild degenerative change throughout thoracic spine without central canal stenosis or neural foraminal narrowing.  Chronic compression deformities of the superior endplates of T12 and L1.    MRI LUMBAR SPINE WITHOUT CONTRAST 2022   Chronic wedge compression deformity of the superior endplates of T12 and L1, stable.  Remaining vertebral body heights are within normal limits.  The marrow signal is normal without evidence for a marrow replacement process, infection or tumor.  The conus terminates at L1.     At T11-12, no disc herniation, central canal stenosis or neural foraminal narrowing.     At T12-L1, broad-based posterior disc bulge without central canal stenosis or neural foraminal narrowing.     At L1-2, bilateral facet arthropathy.  No disc herniation, central canal stenosis or neural foraminal narrowing.     At L2-3, no disc herniation, central canal stenosis or neural foraminal narrowing.     At L3-4, broad-based posterior disc bulge extending into both neural foramina with facet arthropathy.  No central canal stenosis or neural foraminal narrowing.     At L4-5, bilateral facet arthropathy.  No central canal stenosis or neural foraminal narrowing.     At  "L5-S1, no disc herniation, central canal stenosis or neural foraminal narrowing.    EMG 4/2021 : moderate carpal tunnel syndrome bilaterally. No evidence of cervical or lumbar radiculopathy.     ASSESSMENT:  Steph Harkins is a 53 y.o. female with the following diagnoses based on history, exam, and imaging:  Problem List Items Addressed This Visit    None  Visit Diagnoses       Chronic pain syndrome    -  Primary    Chronic neck pain        Chronic bilateral low back pain without sciatica              This is a pleasant 53 y.o. female patient with PMH anxiety depression, COPD, hyperlipidemia, tobacco use disorder, osteoporosis on Fosamax, wedge compression deformity of T12, compression fracture of the L1 tobacco use disorder, chronic neck pain, bilateral carpal tunnel syndrome, presenting with***.     We discussed the underlying diagnoses and multiple treatment options including non-opioid medications, interventional procedures, physical therapy, home exercise, core muscle enhancement, and weight loss.  The risks and benefits of each treatment option were discussed and all questions were answered.      Treatment Plan:    Diagnostics/Referrals: *** .    Medications:    NSAIDs: {GANSIAD:79475}  Topical Agent: {GAYes/No/NA:99388}  TCA/SSRI/SNRI: {GATCA/SSRI/SNRI:36031}  Anti-convulsants: {GAAnticonvulsants:70219}  Muscle Relaxants: {GAmuscle Relaxant:41082}  Opioids: {GAopioid:51629::"None"}    Interventional Therapy: Please schedule for {Procedure:07707}.    Regarding the above interventions, the patient has been educated regarding the risks (including bleeding, infection, increased pain, nerve damage, or allergic reaction), benefits, and alternatives. The patient states she understands and is eager to proceed.    Physical Rehabilitation: {blhtherapy:20350}.    Patient Education: Counseled patient regarding the importance of {:02537}, I have stressed the importance of physical activity and a home exercise plan " to help with pain and improve health.    Follow-up: RTC ***.    May consider:     I would like to thank Chi Toledo MD for the opportunity to assist in the care of this patient. We had a very nice visit and I look forward to continuing their care. Please let me know if I can be of further assistance.     Toshia Roth MD  Anesthesiologist  Interventional Pain Medicine  03/07/2024    Disclaimer:  This note was prepared using voice recognition system and is likely to have sound alike errors that may have been overlooked even after proof reading.  Please call me with any questions.

## 2024-03-07 NOTE — PROGRESS NOTES
[facility] New Patient Evaluation    Steph Harkins  : 1970  Date: 3/7/2024     CHIEF COMPLAINT:  Neck Pain, Mid-back Pain, and Low-back Pain  Referring Physician: Chi Toledo MD  Primary Care Physician: Iglesia Toledo PA    HPI:  This is a 53 y.o. female with a chief complaint of Neck Pain, Mid-back Pain, and Low-back Pain  . The patient has Past medical history/Past surgical history of anxiety depression, COPD, hyperlipidemia, tobacco use disorder, osteoporosis on Fosamax, wedge compression deformity of T12, compression fracture of the L1 tobacco use disorder, chronic neck pain, bilateral carpal tunnel syndrome    Patient was previously seen by neurosurgery team at [facility] for chronic neck and back pain, received cervical epidural steroid injection, carpal tunnel injection with limited relief, last seen 2024.  Patient was evaluated and referred by primary care provider for neck and back pain.    Patient states that she is established with Janet Joshi at [facility] but would like to move pain provider closer to home. Advised that Narcotic medications are not to be taken over and should be continued with prescribing provider.    Diabetic: No    Anticogualtion drugs: None    Allergy To Iodine: No    Currently on Antibiotic: No    Current Description of Pain Symptoms:    History of Recent Fall or Trauma: No   Onset: Chronic, started 2018  Pain Location: neck  Radiates/associated symptoms: upper, mid, lower back, BUE, BLE.   Pain is Getting worse over the last 3 months    The pain is described as aching, burning, crushing, numbing, and stabbing.   Exacerbating factors: Sitting, Standing, and Bending.   Mitigating factors Rest and pain medications.   Symptoms interfere with daily activity, sleeping.   The patient feels like symptoms have been unchanged.   Patient denies night fever/night sweats, urinary incontinence, bowel incontinence, significant weight loss,  significant motor weakness, and loss of sensations.    Pain score:   Current: 8/10  Best: 5/10  Worst: 10/10    Current pain medications:      EScitalopram oxalate (LEXAPRO) 10 MG tablet.    HYDROcodone-acetaminophen (NORCO)  mg per tablet, b.i.d. by Janet Joshi     pregabalin (LYRICA) 100 MG capsule, b.i.d. by Janet Joshi     Current Narcotics/Opioid /benzo Medications:  Opioids- Hydrocodone with Acetaminophen (Norco)  Benzodiazepines: No    UDS:  NA    PDMP:  Reviewed and consistent with medication use as prescribed.     Previous Chronic Pain Treatment History:  Physical Therapy/HEP/Physician Lead Exercise Program:  Over the past 12 months, Patient has done  0 sessions.  PT response: n/a   Dates of the PT sessions: n/a  Is patient participating in home exercise program (HEP): Yes.    Non-interventional Pain Therapy:  []Chiropractor.   []Acupuncture/Dry needle.  []TENS unit.  []Heat/ICE.  []Back Brace.    Medications previously tried:  NSAIDs: Ibuprofen (Advil/Motrin)  Topical Agent: Yes  TCA/SSRI/SNRI: SSRI: Escitalopram (Lexapro)  Anti-convulsants: Gabapentin   Muscle Relaxants: Flexeril (Cyclobenzaprine)  Opioids- Hydrocodone with Acetaminophen (Norco).    Interventional Pain Procedures:  Cervical RFA- summer 2023  MARISA Lumbar-   Lumbar RFA L3,4,5 2024- she reported 50% relief    Previous spine/Relevant joint surgery:  N/a  Surgical History:   has a past surgical history that includes  section; Hysterectomy; and Oophorectomy.  Medical History:   has a past medical history of Asthma, Chronic back pain, and Migraine.  Family History:  family history includes Breast cancer in her maternal aunt; Cancer in her maternal aunt and maternal cousin; Heart disease in her maternal uncle and mother; Hypertension in her mother; No Known Problems in her father.  Allergies:  Bee sting [allergen ext-venom-honey bee], Codeine, Penicillins, and Peas   Social History/SUBSTANCE ABUSE HISTORY:  Personal  "history of substance abuse: No   reports that she has been smoking cigarettes. She started smoking about 40 years ago. She has a 40.2 pack-year smoking history. She has never used smokeless tobacco. She reports current alcohol use. She reports current drug use. Drug: Marijuana.  LABS:  CBC  Lab Results   Component Value Date    WBC 7.95 03/27/2019    HGB 14.0 03/27/2019    HCT 42.7 03/27/2019     Coagulation Profile   Lab Results   Component Value Date     03/27/2019     No results found for: "PT", "PTT", "INR"  CMP:  BMP  Lab Results   Component Value Date     08/28/2020    K 4.3 08/28/2020     08/28/2020    CO2 28 08/28/2020    BUN 10 08/28/2020    CREATININE 0.7 08/28/2020    CALCIUM 8.9 08/28/2020    ANIONGAP 8 08/28/2020     Lab Results   Component Value Date    ALT 8 (L) 08/28/2020    AST 14 08/28/2020    ALKPHOS 80 08/28/2020    BILITOT 0.3 08/28/2020     HGBA1C:  No results found for: "LABA1C", "HGBA1C"    ROS:    Review of Systems   GENERAL:  No weight loss, malaise or fevers.  HEENT:   No recent changes in vision or hearing  NECK:  Negative for lumps, no difficulty with swallowing.  RESPIRATORY:  Negative for cough, wheezing or shortness of breath, patient denies any recent URI.  CARDIOVASCULAR:  Negative for chest pain, leg swelling or palpitations.  GI:  Negative for abdominal discomfort, blood in stools or black stools or change in bowel habits.  MUSCULOSKELETAL:  See HPI.  SKIN:  Negative for lesions, rash, and itching.  PSYCH:  No mood disorder or recent psychosocial stressors.   HEMATOLOGY/LYMPHOLOGY:  See the blood thinner sectioned in HPI.  NEURO:  See HPI  All other reviewed and negative other than HPI.    PHYSICAL EXAM:  VITALS: /66   Pulse 60   Resp 18   Ht 5' 4" (1.626 m)   Wt 55.9 kg (123 lb 4.8 oz)   SpO2 96%   BMI 21.16 kg/m²   Body mass index is 21.16 kg/m².  GENERAL: Well appearing, in no acute distress, alert and oriented x3, answers questions appropriately. "   PSYCH: Flat affect.  SKIN: Skin color, texture, turgor normal, no rashes or lesions.  HEAD/FACE:  Normocephalic, atraumatic. Cranial nerves grossly intact.  CV: Regular rate  PULM: No evidence of respiratory difficulty, symmetric chest rise.  GI:  Soft and non-Distended.  BACK/SIJ/HIP:  Lumbar Spine Exam:       Inspection: No erythema, bruising.       Palpation: (+) TTP of lumbar paraspinals bilaterally      ROM:  Limited in flexion, extension, lateral bending.       (+) Facet loading bilaterally    Hip Exam:      Inspection: No gross deformity or apparent leg length discrepancy      Palpation:  No TTP to bilateral greater trochanteric bursas.       ROM:  No limitation or pain in internal rotation, external rotation b/l  Neurologic Exam:     Alert. Speech is fluent and appropriate.     Strength: 5/5 in bilateral hip flexion and knee extension     Sensation:  Grossly intact to light touch in bilateral lower extremities     Tone: No abnormality appreciated in bilateral lower extremities    DIAGNOSTIC STUDIES AND MEDICAL RECORDS REVIEW:      MRI CERVICAL SPINE WITHOUT CONTRAST  C2-C3: No central spinal canal or foraminal stenosis.  C3-C4: Broad-based disc bulging with uncovertebral and facet joint hypertrophy results in mild central spinal canal stenosis with mild narrowing of the right neural foramen and moderate narrowing the left neural foramen.  Narrowed AP canal diameter measures 9.9 mm.  C4-C5: Broad-based disc bulging with uncovertebral and facet joint hypertrophy results in mild central spinal canal stenosis with moderate bilateral neural foraminal narrowing.  Narrowed AP canal diameter measures 9.3 mm.  C5-C6: Broad-based disc bulging with uncovertebral and facet joint hypertrophy results in mild central spinal canal stenosis with mild bilateral neural foraminal narrowing.  Narrowed AP canal diameter measures 8.5 mm.  C6-C7: No central spinal canal or foraminal stenosis.  C7-T1: No central spinal canal or  foraminal stenosis.     MRI THORACIC SPINE WITHOUT CONTRAST  Mild degenerative change throughout thoracic spine without central canal stenosis or neural foraminal narrowing.  Chronic compression deformities of the superior endplates of T12 and L1.    MRI LUMBAR SPINE WITHOUT CONTRAST 2022   Chronic wedge compression deformity of the superior endplates of T12 and L1, stable.  Remaining vertebral body heights are within normal limits.  The marrow signal is normal without evidence for a marrow replacement process, infection or tumor.  The conus terminates at L1.  At T11-12, no disc herniation, central canal stenosis or neural foraminal narrowing.  At T12-L1, broad-based posterior disc bulge without central canal stenosis or neural foraminal narrowing.  At L1-2, bilateral facet arthropathy.  No disc herniation, central canal stenosis or neural foraminal narrowing.  At L2-3, no disc herniation, central canal stenosis or neural foraminal narrowing.  At L3-4, broad-based posterior disc bulge extending into both neural foramina with facet arthropathy.  No central canal stenosis or neural foraminal narrowing.  At L4-5, bilateral facet arthropathy.  No central canal stenosis or neural foraminal narrowing.  At L5-S1, no disc herniation, central canal stenosis or neural foraminal narrowing.  EMG 4/2021 : moderate carpal tunnel syndrome bilaterally. No evidence of cervical or lumbar radiculopathy.     ASSESSMENT:  Steph Harkins is a 53 y.o. female with the following diagnoses based on history, exam, and imaging:  Problem List Items Addressed This Visit    None  Visit Diagnoses       Chronic pain syndrome    -  Primary    Chronic neck pain        Chronic bilateral low back pain without sciatica        Other osteoarthritis of spine, cervical region        Other osteoarthritis of spine, thoracic region        Other osteoarthritis of spine, lumbar region              This is a pleasant 53 y.o. female patient with PMH anxiety  depression, COPD, hyperlipidemia, tobacco use disorder, osteoporosis on Fosamax, wedge compression deformity of T12, compression fracture of the L1 tobacco use disorder, chronic neck pain, bilateral carpal tunnel syndrome, presenting with axial low back pain, she sees [facility] for interventional treatment procedures, recently had ablation with no significant relief, in addition to medication management including hydrocodone and Lyrica, requesting me to take over narcotic therapy, patient was advised to continue with [facility] and follow up with me as needed.     Treatment Plan:    Diagnostics/Referrals:  Continue with [facility].    Medications:  Continue as prescribed  NSAIDs: None  Topical Agent: No  TCA/SSRI/SNRI: SSRI: Escitalopram (Lexapro)  Anti-convulsants: Lyrica   Muscle Relaxants: None  Opioids: None and Hydrocodone with Acetaminophen (Norco)    Interventional Therapy:  Continue with the [facility] interventional pain plan.    Physical Rehabilitation:  Home exercise program.    Patient Education: Counseled patient regarding the importance of activity modification, I have stressed the importance of physical activity and a home exercise plan to help with pain and improve health.    Follow-up: RTC as needed.    Toshia Roth MD  Anesthesiologist  Interventional Pain Medicine  03/07/2024    Disclaimer:  This note was prepared using voice recognition system and is likely to have sound alike errors that may have been overlooked even after proof reading.  Please call me with any questions.

## 2024-03-10 DIAGNOSIS — F17.200 SMOKER: Primary | ICD-10-CM

## 2024-03-10 NOTE — PROGRESS NOTES
Her WBC was slightly elevated. Did she have a cold recently? Or take any steroids? Steroid shot for a cold? All of these could have elevated her WBC.  Repeat CBC in 1 month

## 2024-03-12 NOTE — PROGRESS NOTES
Subjective:      Patient ID: Steph Harkins is a 53 y.o. female.    Chief Complaint: No chief complaint on file.    Review of patient's allergies indicates:   Allergen Reactions    Bee sting [allergen ext-venom-honey bee] Swelling    Codeine Itching    Penicillins Swelling    Peas Swelling      Pt returns to clinic for follow up of bilateral carpal tunnel syndrome.  She did have good relief of right sided symptoms after carpal tunnel injection at last visit, however relief only lasted a few weeks.  Left sided symptoms have continued.    2/1/24:  52 yo RHD F presents to clinic with c/o intermittent bilateral hand numbness/tingling x years.  No injury or trauma.  Worse at night and when she wakes up in the morning.  She has tried wrist braces at night with little improvement of symptoms.  Recent EMG showed mild bilateral carpal tunnel syndrome.    She does also have history of neck pain, sees pain management in [place].            Review of Systems   Constitutional: Negative for chills, diaphoresis and fever.   HENT:  Negative for congestion, ear discharge and ear pain.    Eyes:  Negative for blurred vision, discharge, double vision and pain.   Cardiovascular:  Negative for chest pain, claudication and cyanosis.   Respiratory:  Negative for cough, hemoptysis and shortness of breath.    Endocrine: Negative for cold intolerance and heat intolerance.   Skin:  Negative for color change, dry skin, itching and rash.   Musculoskeletal:  Positive for neck pain. Negative for arthritis, back pain, falls, gout, joint pain, joint swelling and muscle weakness.   Gastrointestinal:  Negative for abdominal pain and change in bowel habit.   Neurological:  Positive for numbness and paresthesias. Negative for brief paralysis, disturbances in coordination and dizziness.   Psychiatric/Behavioral:  Negative for altered mental status and depression.          Objective:          General    Constitutional: She is oriented to person,  place, and time. She appears well-developed and well-nourished. No distress.   HENT:   Head: Atraumatic.   Eyes: EOM are normal. Right eye exhibits no discharge. Left eye exhibits no discharge.   Cardiovascular:  Normal rate.            Pulmonary/Chest: Effort normal. No respiratory distress.   Abdominal: Soft.   Neurological: She is alert and oriented to person, place, and time.   Psychiatric: She has a normal mood and affect. Her behavior is normal.             Right Hand/Wrist Exam     Inspection   Scars: Wrist - absent Hand -  absent  Effusion: Wrist - absent Hand -  absent  Bruising: Wrist - absent Hand -  absent  Deformity: Wrist - deformity Hand -  deformity    Range of Motion     Wrist   Extension:  normal   Flexion:  normal   Pronation:  normal   Supination:  normal     Tests   Phalens sign: positive  Tinel's sign (median nerve): positive  Finkelstein's test: negative  Carpal Tunnel Compression Test: positive  Cubital Tunnel Compression Test: negative      Other     Neuorologic Exam    Median Distribution: normal  Ulnar Distribution: normal  Radial Distribution: normal      Left Hand/Wrist Exam     Inspection   Scars: Wrist - absent Hand -  absent  Effusion: Wrist - absent Hand -  absent  Bruising: Wrist - absent Hand -  absent  Deformity: Wrist - absent Hand -  absent    Range of Motion     Wrist   Extension:  normal   Flexion:  normal   Pronation:  normal   Supination:  normal     Tests   Phalens Sign: negative  Tinel's sign (median nerve): negative  Finkelstein's test: negative  Carpal Tunnel Compression Test: negative  Cubital Tunnel Compression Test: negative      Other     Sensory Exam  Median Distribution: normal  Ulnar Distribution: normal  Radial Distribution: normal      Right Elbow Exam     Tests   Tinel's sign (cubital tunnel): negative      Left Elbow Exam     Tests   Tinel's sign (cubital tunnel): negative        Vascular Exam       Capillary Refill  Right Hand: normal capillary refill  Left  Hand: normal capillary refill                  Assessment:         EMG BUE 5/23/23:  Mild Bilateral Carpal Tunnel Syndrome       No diagnosis found.   There are no diagnoses linked to this encounter.             Plan:         I made the decision to obtain old records of the patient including previous notes and imaging.     The total face-to-face encounter time with this patient was 30 minutes and greater than 50% of of the encounter time was spent counseling the patient, coordinating care, and education regarding the pathology of his/her diagnosis. We have discussed a variety of treatment options including medications, bracing, nerve glide exercises, injections, occupational therapy and surgery. Pt is requesting left carpal tunnel injection today.  Today, the patient chooses  a CSI and understands a minimum of 3 months time must lapse after injection, prior to a surgical procedure due to increased risk of infection.     She is asking to schedule right carpal tunnel release.    PROCEDURE:  I have explained the risks, benefits, and alternatives of the procedure in detail.  The patient voices understanding and all questions have been answered.  The patient agrees to proceed as planned. The palmaris longus tendon was identified and marked and so was the distal wrist crease After I performed a sterile prep of the skin in the normal fashion the left carpal tunnel is injected from the volar approach using a 25 gauge needle with a combination of 1cc 1% plain lidocaine and 4 mg of dexamethasone.  The patient is cautioned and immediate relief of pain is secondary to the local anesthetic and will be temporary.  After the anesthetic wears off there may be a increase in pain that may last for a few hours or a few days and they should use ice to help alleviate this flare up of pain. Patient tolerated the procedure well. The patient has been asked to report to us any redness, swelling, inflammation, or fevers. The patient has been  asked to restrict the use of the left upper extremity for the next 24 hours.     2. Right carpal tunnel release scheduled for 4/2 with Dr. Gale in [place].  3. Nerve glide exercises.  4. Wrist braces to be worn while sleeping and as needed during the day.  5. Ice compress to the affected area 2-3x a day for 15-20 minutes as needed for pain management.  6. RTC in 6 weeks for follow-up, sooner if needed.      Patient voices understanding of and agreement with treatment plan. All of the patient's questions were answered and the patient will contact us if she has any questions or concerns in the interim.

## 2024-03-12 NOTE — H&P (VIEW-ONLY)
Subjective:      Patient ID: Steph Harkins is a 53 y.o. female.    Chief Complaint: No chief complaint on file.    Review of patient's allergies indicates:   Allergen Reactions    Bee sting [allergen ext-venom-honey bee] Swelling    Codeine Itching    Penicillins Swelling    Peas Swelling      Pt returns to clinic for follow up of bilateral carpal tunnel syndrome.  She did have good relief of right sided symptoms after carpal tunnel injection at last visit, however relief only lasted a few weeks.  Left sided symptoms have continued.    2/1/24:  52 yo RHD F presents to clinic with c/o intermittent bilateral hand numbness/tingling x years.  No injury or trauma.  Worse at night and when she wakes up in the morning.  She has tried wrist braces at night with little improvement of symptoms.  Recent EMG showed mild bilateral carpal tunnel syndrome.    She does also have history of neck pain, sees pain management in [place].            Review of Systems   Constitutional: Negative for chills, diaphoresis and fever.   HENT:  Negative for congestion, ear discharge and ear pain.    Eyes:  Negative for blurred vision, discharge, double vision and pain.   Cardiovascular:  Negative for chest pain, claudication and cyanosis.   Respiratory:  Negative for cough, hemoptysis and shortness of breath.    Endocrine: Negative for cold intolerance and heat intolerance.   Skin:  Negative for color change, dry skin, itching and rash.   Musculoskeletal:  Positive for neck pain. Negative for arthritis, back pain, falls, gout, joint pain, joint swelling and muscle weakness.   Gastrointestinal:  Negative for abdominal pain and change in bowel habit.   Neurological:  Positive for numbness and paresthesias. Negative for brief paralysis, disturbances in coordination and dizziness.   Psychiatric/Behavioral:  Negative for altered mental status and depression.          Objective:          General    Constitutional: She is oriented to person,  place, and time. She appears well-developed and well-nourished. No distress.   HENT:   Head: Atraumatic.   Eyes: EOM are normal. Right eye exhibits no discharge. Left eye exhibits no discharge.   Cardiovascular:  Normal rate.            Pulmonary/Chest: Effort normal. No respiratory distress.   Abdominal: Soft.   Neurological: She is alert and oriented to person, place, and time.   Psychiatric: She has a normal mood and affect. Her behavior is normal.             Right Hand/Wrist Exam     Inspection   Scars: Wrist - absent Hand -  absent  Effusion: Wrist - absent Hand -  absent  Bruising: Wrist - absent Hand -  absent  Deformity: Wrist - deformity Hand -  deformity    Range of Motion     Wrist   Extension:  normal   Flexion:  normal   Pronation:  normal   Supination:  normal     Tests   Phalens sign: positive  Tinel's sign (median nerve): positive  Finkelstein's test: negative  Carpal Tunnel Compression Test: positive  Cubital Tunnel Compression Test: negative      Other     Neuorologic Exam    Median Distribution: normal  Ulnar Distribution: normal  Radial Distribution: normal      Left Hand/Wrist Exam     Inspection   Scars: Wrist - absent Hand -  absent  Effusion: Wrist - absent Hand -  absent  Bruising: Wrist - absent Hand -  absent  Deformity: Wrist - absent Hand -  absent    Range of Motion     Wrist   Extension:  normal   Flexion:  normal   Pronation:  normal   Supination:  normal     Tests   Phalens Sign: negative  Tinel's sign (median nerve): negative  Finkelstein's test: negative  Carpal Tunnel Compression Test: negative  Cubital Tunnel Compression Test: negative      Other     Sensory Exam  Median Distribution: normal  Ulnar Distribution: normal  Radial Distribution: normal      Right Elbow Exam     Tests   Tinel's sign (cubital tunnel): negative      Left Elbow Exam     Tests   Tinel's sign (cubital tunnel): negative        Vascular Exam       Capillary Refill  Right Hand: normal capillary refill  Left  Hand: normal capillary refill                  Assessment:         EMG BUE 5/23/23:  Mild Bilateral Carpal Tunnel Syndrome       No diagnosis found.   There are no diagnoses linked to this encounter.             Plan:         I made the decision to obtain old records of the patient including previous notes and imaging.     The total face-to-face encounter time with this patient was 30 minutes and greater than 50% of of the encounter time was spent counseling the patient, coordinating care, and education regarding the pathology of his/her diagnosis. We have discussed a variety of treatment options including medications, bracing, nerve glide exercises, injections, occupational therapy and surgery. Pt is requesting left carpal tunnel injection today.  Today, the patient chooses  a CSI and understands a minimum of 3 months time must lapse after injection, prior to a surgical procedure due to increased risk of infection.     She is asking to schedule right carpal tunnel release.    PROCEDURE:  I have explained the risks, benefits, and alternatives of the procedure in detail.  The patient voices understanding and all questions have been answered.  The patient agrees to proceed as planned. The palmaris longus tendon was identified and marked and so was the distal wrist crease After I performed a sterile prep of the skin in the normal fashion the left carpal tunnel is injected from the volar approach using a 25 gauge needle with a combination of 1cc 1% plain lidocaine and 4 mg of dexamethasone.  The patient is cautioned and immediate relief of pain is secondary to the local anesthetic and will be temporary.  After the anesthetic wears off there may be a increase in pain that may last for a few hours or a few days and they should use ice to help alleviate this flare up of pain. Patient tolerated the procedure well. The patient has been asked to report to us any redness, swelling, inflammation, or fevers. The patient has been  asked to restrict the use of the left upper extremity for the next 24 hours.     2. Right carpal tunnel release scheduled for 4/2 with Dr. Gale in [place].  3. Nerve glide exercises.  4. Wrist braces to be worn while sleeping and as needed during the day.  5. Ice compress to the affected area 2-3x a day for 15-20 minutes as needed for pain management.  6. RTC in 6 weeks for follow-up, sooner if needed.      Patient voices understanding of and agreement with treatment plan. All of the patient's questions were answered and the patient will contact us if she has any questions or concerns in the interim.

## 2024-03-13 ENCOUNTER — HOSPITAL ENCOUNTER (OUTPATIENT)
Dept: RADIOLOGY | Facility: HOSPITAL | Age: 54
Discharge: HOME | End: 2024-03-13
Attending: FAMILY MEDICINE
Payer: MEDICARE

## 2024-03-13 VITALS — WEIGHT: 123 LBS | HEIGHT: 64 IN | BODY MASS INDEX: 21 KG/M2

## 2024-03-13 DIAGNOSIS — M81.0 OSTEOPOROSIS, POSTMENOPAUSAL: ICD-10-CM

## 2024-03-13 DIAGNOSIS — Z12.31 BREAST CANCER SCREENING BY MAMMOGRAM: ICD-10-CM

## 2024-03-13 PROCEDURE — 77080 DXA BONE DENSITY AXIAL: CPT | Mod: 26,,, | Performed by: RADIOLOGY

## 2024-03-13 PROCEDURE — 77080 DXA BONE DENSITY AXIAL: CPT | Mod: TC

## 2024-03-13 PROCEDURE — 77067 SCR MAMMO BI INCL CAD: CPT | Mod: TC

## 2024-03-13 PROCEDURE — 77067 SCR MAMMO BI INCL CAD: CPT | Mod: 26,,, | Performed by: RADIOLOGY

## 2024-03-14 ENCOUNTER — OFFICE VISIT (OUTPATIENT)
Dept: ORTHOPEDICS | Facility: CLINIC | Age: 54
End: 2024-03-14
Payer: MEDICARE

## 2024-03-14 VITALS
BODY MASS INDEX: 21.68 KG/M2 | WEIGHT: 127 LBS | OXYGEN SATURATION: 99 % | SYSTOLIC BLOOD PRESSURE: 124 MMHG | RESPIRATION RATE: 18 BRPM | HEIGHT: 64 IN | DIASTOLIC BLOOD PRESSURE: 68 MMHG | HEART RATE: 64 BPM

## 2024-03-14 DIAGNOSIS — G56.02 LEFT CARPAL TUNNEL SYNDROME: ICD-10-CM

## 2024-03-14 DIAGNOSIS — G56.01 RIGHT CARPAL TUNNEL SYNDROME: Primary | ICD-10-CM

## 2024-03-14 PROCEDURE — 99214 OFFICE O/P EST MOD 30 MIN: CPT | Mod: S$PBB | Performed by: PHYSICIAN ASSISTANT

## 2024-03-14 PROCEDURE — 99999 PR PBB SHADOW E&M-EST. PATIENT-LVL V: CPT | Mod: PBBFAC,,, | Performed by: PHYSICIAN ASSISTANT

## 2024-03-14 PROCEDURE — 99999 PR STA SHADOW: CPT | Mod: PBBFAC,,, | Performed by: PHYSICIAN ASSISTANT

## 2024-03-14 PROCEDURE — 20526 THER INJECTION CARP TUNNEL: CPT | Mod: S$PBB | Performed by: PHYSICIAN ASSISTANT

## 2024-03-14 PROCEDURE — 99999PBSHW PR PBB SHADOW TECHNICAL ONLY FILED TO HB: Mod: PBBFAC,,,

## 2024-03-14 PROCEDURE — 99215 OFFICE O/P EST HI 40 MIN: CPT | Mod: PBBFAC | Performed by: PHYSICIAN ASSISTANT

## 2024-03-14 PROCEDURE — 20526 THER INJECTION CARP TUNNEL: CPT | Mod: PBBFAC | Performed by: PHYSICIAN ASSISTANT

## 2024-03-14 RX ORDER — MUPIROCIN 20 MG/G
OINTMENT TOPICAL
Status: CANCELLED | OUTPATIENT
Start: 2024-03-14

## 2024-03-14 RX ORDER — DEXAMETHASONE SODIUM PHOSPHATE 4 MG/ML
4 INJECTION, SOLUTION INTRA-ARTICULAR; INTRALESIONAL; INTRAMUSCULAR; INTRAVENOUS; SOFT TISSUE ONCE
Status: COMPLETED | OUTPATIENT
Start: 2024-03-14 | End: 2024-03-14

## 2024-03-14 RX ADMIN — DEXAMETHASONE SODIUM PHOSPHATE 4 MG: 4 INJECTION, SOLUTION INTRAMUSCULAR; INTRAVENOUS at 08:03

## 2024-03-15 ENCOUNTER — ANESTHESIA EVENT (OUTPATIENT)
Dept: SURGERY | Facility: HOSPITAL | Age: 54
End: 2024-03-15
Payer: MEDICARE

## 2024-03-18 ENCOUNTER — HOSPITAL ENCOUNTER (OUTPATIENT)
Dept: PREADMISSION TESTING | Facility: HOSPITAL | Age: 54
Discharge: HOME | End: 2024-03-18
Attending: NURSE PRACTITIONER
Payer: MEDICARE

## 2024-03-18 PROBLEM — R20.0 BILATERAL NUMBNESS AND TINGLING OF ARMS AND LEGS: Status: ACTIVE | Noted: 2021-02-26

## 2024-03-18 PROBLEM — F33.2 SEVERE RECURRENT MAJOR DEPRESSION WITHOUT PSYCHOTIC FEATURES: Status: ACTIVE | Noted: 2021-02-26

## 2024-03-18 PROBLEM — G56.03 BILATERAL CARPAL TUNNEL SYNDROME: Status: ACTIVE | Noted: 2022-12-20

## 2024-03-18 PROBLEM — S32.010A CLOSED COMPRESSION FRACTURE OF BODY OF L1 VERTEBRA: Status: ACTIVE | Noted: 2021-02-26

## 2024-03-18 PROBLEM — M54.6 CHRONIC BILATERAL THORACIC BACK PAIN: Status: ACTIVE | Noted: 2021-02-26

## 2024-03-18 PROBLEM — R20.2 BILATERAL NUMBNESS AND TINGLING OF ARMS AND LEGS: Status: ACTIVE | Noted: 2021-02-26

## 2024-03-18 PROBLEM — F51.05 INSOMNIA RELATED TO ANOTHER MENTAL DISORDER: Status: ACTIVE | Noted: 2021-02-26

## 2024-03-18 PROBLEM — S22.080G CLOSED WEDGE COMPRESSION FRACTURE OF T12 VERTEBRA WITH DELAYED HEALING: Status: ACTIVE | Noted: 2021-04-23

## 2024-03-18 PROBLEM — M54.2 NECK PAIN: Status: ACTIVE | Noted: 2021-02-26

## 2024-03-18 PROBLEM — G89.29 CHRONIC BILATERAL THORACIC BACK PAIN: Status: ACTIVE | Noted: 2021-02-26

## 2024-03-18 NOTE — ANESTHESIA PREPROCEDURE EVALUATION
2024  Steph Harkins is a 54 y.o., female scheduled for  RELEASE, CARPAL TUNNEL (Right: Hand) 24.    Past Medical History:   Diagnosis Date    Asthma     Chronic back pain     Migraine      Past Surgical History:   Procedure Laterality Date     SECTION      HYSTERECTOMY      total    OOPHORECTOMY       Review of patient's allergies indicates:   Allergen Reactions    Bee sting [allergen ext-venom-honey bee] Swelling    Codeine Itching    Penicillins Swelling    Peas Swelling     Current Outpatient Medications   Medication Instructions    albuterol (PROVENTIL/VENTOLIN HFA) 90 mcg/actuation inhaler 1-2 puffs, Inhalation, Every 6 hours PRN    alendronate (FOSAMAX) 70 mg, Oral, Every 7 days    benzonatate (TESSALON PERLES) 100 MG capsule 1 capsule as needed    diclofenac sodium (VOLTAREN) 1 % Gel No dose, route, or frequency recorded.    EScitalopram oxalate (LEXAPRO) 10 MG tablet take one tablet daily    HYDROcodone-acetaminophen (NORCO)  mg per tablet Take 1 tablet by mouth 2 (two) times daily as needed. for pain    lovastatin (MEVACOR) 20 MG tablet     montelukast (SINGULAIR) 10 mg tablet No dose, route, or frequency recorded.    montelukast (SINGULAIR) 10 mg tablet Take 1 tablet Orally Once a day 30 days    pregabalin (LYRICA) 100 MG capsule Take 1 capsule (100 mg total) by mouth 2 (two) times daily. for neuropathic pain    traZODone (DESYREL) 50 MG tablet take one tablet by mouth at bedtime    TRELEGY ELLIPTA 100-62.5-25 mcg DsDv No dose, route, or frequency recorded.       Pre-op Assessment    I have reviewed the Patient Summary Reports.     I have reviewed the Nursing Notes.    I have reviewed the Medications.     Review of Systems  Anesthesia Hx:  No problems with previous Anesthesia               Denies Personal Hx of Anesthesia complications.                    Social:  Smoker,  Social Alcohol Use       Cardiovascular:  Exercise tolerance: good   Denies Pacemaker.        Denies Angina.     hyperlipidemia                             Pulmonary:   COPD, moderate Asthma moderate      Asthma:   Chronic Obstructive Pulmonary Disease (COPD):                      Hepatic/GI:  Hepatic/GI Normal                 Neurological:  Denies TIA.  Denies CVA. Neuromuscular Disease,  Headaches Denies Seizures.     Dx of Headaches                         Neuromuscular Disease   Endocrine:  Endocrine Normal            Psych:  Psychiatric History anxiety                 Physical Exam  General: Cooperative and Oriented    Dental:  Dentures  Upper and lower dentures    Lab Results   Component Value Date    WBC 13.47 (H) 03/07/2024    HGB 14.6 03/07/2024    HCT 45.0 03/07/2024     (H) 03/07/2024    CHOL 169 03/07/2024    TRIG 110 03/07/2024    HDL 45 03/07/2024    ALT 12 03/07/2024    AST 12 03/07/2024     03/07/2024    K 4.3 03/07/2024     03/07/2024    CREATININE 0.8 03/07/2024    BUN 12 03/07/2024    CO2 28 03/07/2024    TSH 1.927 03/07/2024     No results found for this or any previous visit.      Anesthesia Plan  Type of Anesthesia, risks & benefits discussed:    Anesthesia Type: Gen Natural Airway  Intra-op Monitoring Plan: Standard ASA Monitors  Post Op Pain Control Plan: multimodal analgesia  Induction:  IV  Airway Plan: Direct, Post-Induction  Informed Consent: Informed consent signed with the Patient and all parties understand the risks and agree with anesthesia plan.  All questions answered.   ASA Score: 2  Anesthesia Plan Notes: Anesthesia consent to be signed prior to surgery 4/2/24      Ready For Surgery From Anesthesia Perspective.     .

## 2024-03-18 NOTE — DISCHARGE INSTRUCTIONS
Your surgery is scheduled for 4/2/24.    Please report to [facility] on the 1st Floor at 0830 a.m.    THIS TIME IS SUBJECT TO CHANGE.  YOU WILL RECEIVE A PHONE CALL THE DAY BEFORE SURGERY BY 3:30 PM TO CONFIRM YOUR TIME OF ARRIVAL.  IF YOU HAVE NOT RECEIVED A PHONE CALL BY 3:30 PM THE DAY BEFORE YOUR SURGERY PLEASE CALL [phone]     INSTRUCTIONS IMPORTANT!!!  ¨ Do not eat or drink after 12 midnight-including water, candy, gum, & mints. OK to brush teeth.      ____  Proceed to [facility] on *** for additional testing.        ____  Do not wear makeup, including mascara.  ____  No powder, lotions or creams to surgical area.  ____  Please remove all jewelry, including piercings and leave at home.  ____  No money or valuables needed. Please leave at home.  ____  Please bring any documents given by your doctor.  ____  If going home the same day, arrange for a ride home. You will not be able to             drive if Anesthesia was used.  ____  Children under 18 years require a parent / guardian present the entire time             they are in surgery / recovery.  ____  Wear loose fitting clothing. Allow for dressings, bandages.  ____  Stop Aspirin, Ibuprofen, Motrin, Aleve, Goody's/BC powders, Excedrine and Naproxen (NSAIDS) at least 3-5 days before surgery, unless otherwise instructed by your doctor, or the nurse.   You MAY use Tylenol/acetaminophen until day of surgery.  ____  Wash the surgical area with Hibiclens or Dial Antibacterial bar soap the night before surgery, and again the             morning of surgery.  Be sure to rinse hibiclens or Dial Antibacterial bar soap off completely (if instructed by   nurse).  ____  If you take diabetic medication including Metformin, Glimepiride, Glipizide, Glyburide, Byetta, Januvia, Actos, do not take am of surgery unless instructed by Doctor.  ____ Hold Invokana, Farxiga, and Jardiance for 3 days prior to surgery.   ____  Call MD for temperature  above 101 degrees or any other signs of infection such as Urinary (bladder) infection, Upper respiratory infection, skin boils, etc.  ____ Stop taking any Fish Oil supplement or any Vitamins that contain Vitamin E at least 5 days prior to surgery.  ____ Do Not wear your contact lenses the day of your procedure.  You may wear your glasses.      ____Do not shave surgical site for 3 days prior to surgery.  ____ Practice Good hand washing before, during, and after procedure.      I have read or had read and explained to me, and understand the above information.  Additional comments or instructions:  For additional questions call [phone]      ANESTHESIA SIDE EFFECTS  -For the first 24 hours after surgery:  Do not drive, use heavy equipment, make important decisions, or drink alcohol  -It is normal to feel sleepy for several hours.  Rest until you are more awake.  -Have someone stay with you, if needed.  They can watch for problems and help keep you safe.  -Some possible post anesthesia side effects include: nausea and vomiting, sore throat and hoarseness, sleepiness, and dizziness.        Pre-Op Bathing Instructions    Before surgery, you can play an important role in your own health.    Because skin is not sterile, we need to be sure that your skin is as free of germs as possible. By following the instructions below, you can reduce the number of germs on your skin before surgery.    IMPORTANT: You will need to shower with a special soap called Hibiclens*, available at any pharmacy.  If you are allergic to Chlorhexidine (the antiseptic in Hibiclens), use an antibacterial soap such as Dial Soap for your preoperative shower.  You will shower with Hibiclens both the night before your surgery and the morning of your surgery.  Do not use Hibiclens on the head, face or genitals to avoid injury to those areas.    STEP #1: THE NIGHT BEFORE YOUR SURGERY     Do not shave the area of your body where your surgery will be  performed.  Shower and wash your hair and body as usual with your normal soap and shampoo.  Rinse your hair and body thoroughly after you shower to remove all soap residue.  With your hand, apply one packet of Hibiclens soap to the surgical site.   Wash the site gently for five (5) minutes. Do not scrub your skin too hard.   Do not wash with your regular soap after Hibiclens is used.  Rinse your body thoroughly.  Pat yourself dry with a clean, soft towel.  Do not use lotion, cream, or powder.  Wear clean clothes.    STEP #2: THE MORNING OF YOUR SURGERY     Repeat Step #1.    * Not to be used by people allergic to Chlorhexidine.    Please take Trelegy the morning of surgery.

## 2024-03-18 NOTE — PRE-PROCEDURE INSTRUCTIONS
Arranging a ride home.    Allergies, medical, surgical, family and psychosocial histories reviewed with patient. Periop plan of care reviewed. Preop instructions given, including medications to take and to hold. Hibiclens soap and instructions on use given. Time allotted for questions to be addressed.  Patient verbalized understanding.    Arrival time 0830.

## 2024-03-26 ENCOUNTER — CLINICAL SUPPORT (OUTPATIENT)
Dept: SMOKING CESSATION | Facility: CLINIC | Age: 54
End: 2024-03-26

## 2024-03-26 DIAGNOSIS — F17.200 NICOTINE DEPENDENCE: Primary | ICD-10-CM

## 2024-03-26 PROCEDURE — 99999 PR PBB SHADOW E&M-EST. PATIENT-LVL I: CPT | Mod: PBBFAC,,,

## 2024-03-26 PROCEDURE — 99404 PREV MED CNSL INDIV APPRX 60: CPT | Mod: S$GLB,,,

## 2024-03-26 RX ORDER — BUPROPION HYDROCHLORIDE 150 MG/1
150 TABLET, EXTENDED RELEASE ORAL 2 TIMES DAILY
Qty: 60 TABLET | Refills: 0 | Status: SHIPPED | OUTPATIENT
Start: 2024-03-26 | End: 2024-05-01 | Stop reason: SDUPTHER

## 2024-03-28 ENCOUNTER — HOSPITAL ENCOUNTER (OUTPATIENT)
Dept: RADIOLOGY | Facility: HOSPITAL | Age: 54
Discharge: HOME | End: 2024-03-28
Attending: INTERNAL MEDICINE
Payer: MEDICARE

## 2024-03-28 DIAGNOSIS — J44.9 CHRONIC OBSTRUCTIVE PULMONARY DISEASE, UNSPECIFIED COPD TYPE: Primary | ICD-10-CM

## 2024-03-28 DIAGNOSIS — J44.9 CHRONIC OBSTRUCTIVE PULMONARY DISEASE, UNSPECIFIED COPD TYPE: ICD-10-CM

## 2024-03-28 PROCEDURE — 71046 X-RAY EXAM CHEST 2 VIEWS: CPT | Mod: TC

## 2024-03-28 PROCEDURE — 71046 X-RAY EXAM CHEST 2 VIEWS: CPT | Mod: 26,,, | Performed by: RADIOLOGY

## 2024-04-02 ENCOUNTER — ANESTHESIA (OUTPATIENT)
Dept: SURGERY | Facility: HOSPITAL | Age: 54
End: 2024-04-02
Payer: MEDICARE

## 2024-04-02 ENCOUNTER — HOSPITAL ENCOUNTER (OUTPATIENT)
Facility: HOSPITAL | Age: 54
Discharge: HOME | End: 2024-04-02
Attending: ORTHOPAEDIC SURGERY | Admitting: ORTHOPAEDIC SURGERY
Payer: MEDICARE

## 2024-04-02 DIAGNOSIS — G56.03 BILATERAL CARPAL TUNNEL SYNDROME: Primary | ICD-10-CM

## 2024-04-02 DIAGNOSIS — G56.01 RIGHT CARPAL TUNNEL SYNDROME: ICD-10-CM

## 2024-04-02 PROCEDURE — 37000009 HC ANESTHESIA EA ADD 15 MINS: Performed by: ORTHOPAEDIC SURGERY

## 2024-04-02 PROCEDURE — 63600175 PHARM REV CODE 636 W HCPCS: Performed by: ORTHOPAEDIC SURGERY

## 2024-04-02 PROCEDURE — 25000003 PHARM REV CODE 250: Performed by: NURSE ANESTHETIST, CERTIFIED REGISTERED

## 2024-04-02 PROCEDURE — 36000707: Performed by: ORTHOPAEDIC SURGERY

## 2024-04-02 PROCEDURE — D9220A PRA ANESTHESIA: Mod: ANES,,, | Performed by: ANESTHESIOLOGY

## 2024-04-02 PROCEDURE — 71000015 HC POSTOP RECOV 1ST HR: Performed by: ORTHOPAEDIC SURGERY

## 2024-04-02 PROCEDURE — D9220A PRA ANESTHESIA: Mod: CRNA,,, | Performed by: NURSE ANESTHETIST, CERTIFIED REGISTERED

## 2024-04-02 PROCEDURE — 36000706: Performed by: ORTHOPAEDIC SURGERY

## 2024-04-02 PROCEDURE — 63600175 PHARM REV CODE 636 W HCPCS: Performed by: NURSE ANESTHETIST, CERTIFIED REGISTERED

## 2024-04-02 PROCEDURE — 25000003 PHARM REV CODE 250: Performed by: ORTHOPAEDIC SURGERY

## 2024-04-02 PROCEDURE — 64721 CARPAL TUNNEL SURGERY: CPT | Mod: RT,,, | Performed by: ORTHOPAEDIC SURGERY

## 2024-04-02 PROCEDURE — 37000008 HC ANESTHESIA 1ST 15 MINUTES: Performed by: ORTHOPAEDIC SURGERY

## 2024-04-02 RX ORDER — KETOROLAC TROMETHAMINE 30 MG/ML
30 INJECTION, SOLUTION INTRAMUSCULAR; INTRAVENOUS ONCE
Status: COMPLETED | OUTPATIENT
Start: 2024-04-02 | End: 2024-04-02

## 2024-04-02 RX ORDER — LIDOCAINE HYDROCHLORIDE 10 MG/ML
1 INJECTION, SOLUTION EPIDURAL; INFILTRATION; INTRACAUDAL; PERINEURAL ONCE
Status: ACTIVE | OUTPATIENT
Start: 2024-04-02

## 2024-04-02 RX ORDER — DEXAMETHASONE SODIUM PHOSPHATE 4 MG/ML
INJECTION, SOLUTION INTRA-ARTICULAR; INTRALESIONAL; INTRAMUSCULAR; INTRAVENOUS; SOFT TISSUE
Status: DISCONTINUED | OUTPATIENT
Start: 2024-04-02 | End: 2024-04-02

## 2024-04-02 RX ORDER — HYDROCODONE BITARTRATE AND ACETAMINOPHEN 5; 325 MG/1; MG/1
1 TABLET ORAL EVERY 4 HOURS PRN
Qty: 12 TABLET | Refills: 0 | Status: SHIPPED | OUTPATIENT
Start: 2024-04-02 | End: 2024-04-16

## 2024-04-02 RX ORDER — PROPOFOL 10 MG/ML
VIAL (ML) INTRAVENOUS CONTINUOUS PRN
Status: DISCONTINUED | OUTPATIENT
Start: 2024-04-02 | End: 2024-04-02

## 2024-04-02 RX ORDER — LIDOCAINE HYDROCHLORIDE 10 MG/ML
INJECTION, SOLUTION EPIDURAL; INFILTRATION; INTRACAUDAL; PERINEURAL
Status: DISCONTINUED | OUTPATIENT
Start: 2024-04-02 | End: 2024-04-02 | Stop reason: HOSPADM

## 2024-04-02 RX ORDER — ONDANSETRON HYDROCHLORIDE 2 MG/ML
INJECTION, SOLUTION INTRAVENOUS
Status: DISCONTINUED | OUTPATIENT
Start: 2024-04-02 | End: 2024-04-02

## 2024-04-02 RX ORDER — OXYCODONE HYDROCHLORIDE 5 MG/1
10 TABLET ORAL EVERY 4 HOURS PRN
Status: DISCONTINUED | OUTPATIENT
Start: 2024-04-02 | End: 2024-04-02 | Stop reason: HOSPADM

## 2024-04-02 RX ORDER — CEFAZOLIN SODIUM 1 G/3ML
INJECTION, POWDER, FOR SOLUTION INTRAMUSCULAR; INTRAVENOUS
Status: DISCONTINUED | OUTPATIENT
Start: 2024-04-02 | End: 2024-04-02

## 2024-04-02 RX ORDER — ONDANSETRON 8 MG/1
8 TABLET, ORALLY DISINTEGRATING ORAL EVERY 8 HOURS PRN
Status: DISCONTINUED | OUTPATIENT
Start: 2024-04-02 | End: 2024-04-02 | Stop reason: HOSPADM

## 2024-04-02 RX ORDER — SODIUM CHLORIDE, SODIUM LACTATE, POTASSIUM CHLORIDE, CALCIUM CHLORIDE 600; 310; 30; 20 MG/100ML; MG/100ML; MG/100ML; MG/100ML
INJECTION, SOLUTION INTRAVENOUS CONTINUOUS
Status: ACTIVE | OUTPATIENT
Start: 2024-04-02

## 2024-04-02 RX ORDER — MIDAZOLAM HYDROCHLORIDE 1 MG/ML
INJECTION, SOLUTION INTRAMUSCULAR; INTRAVENOUS
Status: DISCONTINUED | OUTPATIENT
Start: 2024-04-02 | End: 2024-04-02

## 2024-04-02 RX ORDER — PROPOFOL 10 MG/ML
VIAL (ML) INTRAVENOUS
Status: DISCONTINUED | OUTPATIENT
Start: 2024-04-02 | End: 2024-04-02

## 2024-04-02 RX ORDER — BUPIVACAINE HYDROCHLORIDE 5 MG/ML
INJECTION, SOLUTION EPIDURAL; INTRACAUDAL
Status: DISCONTINUED | OUTPATIENT
Start: 2024-04-02 | End: 2024-04-02 | Stop reason: HOSPADM

## 2024-04-02 RX ORDER — ACETAMINOPHEN 325 MG/1
650 TABLET ORAL EVERY 4 HOURS PRN
Status: DISCONTINUED | OUTPATIENT
Start: 2024-04-02 | End: 2024-04-02 | Stop reason: HOSPADM

## 2024-04-02 RX ORDER — MUPIROCIN 20 MG/G
OINTMENT TOPICAL
Status: DISCONTINUED | OUTPATIENT
Start: 2024-04-02 | End: 2024-04-02

## 2024-04-02 RX ADMIN — PROPOFOL 80 MCG/KG/MIN: 10 INJECTION, EMULSION INTRAVENOUS at 09:04

## 2024-04-02 RX ADMIN — ONDANSETRON 4 MG: 2 INJECTION, SOLUTION INTRAMUSCULAR; INTRAVENOUS at 09:04

## 2024-04-02 RX ADMIN — PROPOFOL 80 MG: 10 INJECTION, EMULSION INTRAVENOUS at 09:04

## 2024-04-02 RX ADMIN — DEXAMETHASONE SODIUM PHOSPHATE 4 MG: 4 INJECTION, SOLUTION INTRA-ARTICULAR; INTRALESIONAL; INTRAMUSCULAR; INTRAVENOUS; SOFT TISSUE at 09:04

## 2024-04-02 RX ADMIN — CEFAZOLIN 2 G: 330 INJECTION, POWDER, FOR SOLUTION INTRAMUSCULAR; INTRAVENOUS at 09:04

## 2024-04-02 RX ADMIN — SODIUM CHLORIDE: 0.9 INJECTION, SOLUTION INTRAVENOUS at 09:04

## 2024-04-02 RX ADMIN — MIDAZOLAM 4 MG: 1 INJECTION INTRAMUSCULAR; INTRAVENOUS at 09:04

## 2024-04-02 RX ADMIN — KETOROLAC TROMETHAMINE 30 MG: 30 INJECTION, SOLUTION INTRAMUSCULAR; INTRAVENOUS at 10:04

## 2024-04-02 NOTE — PLAN OF CARE
Discharge criteria met vital signs stable iv removed with catheter in tact. Discharge instructions given patient and family verbalized understanding. Patient discharged via wheelchair accompanied by staff to personal vehicle        Patient refused pain meds upon discharge stating that she is in pain management program

## 2024-04-02 NOTE — OP NOTE
[place] - Surgery (Hospital)  Operative Note      Date of Procedure: 4/2/2024     Procedure: Procedure(s) (LRB):  RELEASE, CARPAL TUNNEL (Right)     Surgeon(s) and Role:     * Nik Gale Jr., MD - Primary    Assisting Surgeon: None    Pre-Operative Diagnosis: Right carpal tunnel syndrome [G56.01]    Post-Operative Diagnosis: Post-Op Diagnosis Codes:     * Right carpal tunnel syndrome [G56.01]    Anesthesia: Local MAC    Operative Findings (including complications, if any): CTS    Description of Technical Procedures: DATE OF PROCEDURE:   4/2/2024     PREOPERATIVE DIAGNOSIS:  right carpal tunnel syndrome.     POSTOPERATIVE DIAGNOSIS: right carpal tunnel syndrome.     OPERATIVE PROCEDURE: right carpal tunnel release.     SURGEON:  Nik Gale Jr. MKAT.     ANESTHESIA:  MAC.     ESTIMATED BLOOD LOSS:  Minimal.     COMPLICATIONS:  None.     SPECIMENS:  None.     BRIEF INDICATIONS:  A 54 y.o.-year-old female with carpal tunnel syndrome, unresponsive to conservative treatment, taken to surgery for the above   procedure.     OPERATIVE PROCEDURE IN DETAIL:  After operative consent was obtained, the   patient was brought to the Operating Room, placed supine on the operating room   table.  Anesthesia by IV sedation followed by injection of Xylocaine and   Marcaine combination into the operative site in the palm.  Following this,   tourniquet applied to the arm.  The arm was then prepped and draped out in the   normal sterile fashion.  The Esmarch used to exsanguinate the limb and the   tourniquet inflated to 225 mmHg.     Following this, a curved incision was made thenar in the crease of palm with a   #15 blade, 2.5 cm in length.  Palmar fascia divided, deep retractors placed.    Transverse carpal ligament identified, carefully divided with the [place] blade.    The median nerve protected with the Neversink elevator and division of ligament   continued proximally and distally under direct visualization.  After complete    release of the median nerve, the contents of the carpal canal were inspected and   noted to be intact including the recurrent branch.  The wound was irrigated and   closed with interrupted 4-0 Monocryl in the subcutaneous layer.  Dermabond on   the skin.  Sterile dressing applied followed by light wrap and the tourniquet   deflated.  The patient was brought to the Recovery Room in stable condition.    All sponge and needle counts reported as correct.  No complications.          Significant Surgical Tasks Conducted by the Assistant(s), if Applicable: retraction    Estimated Blood Loss (EBL): * No values recorded between 4/2/2024  9:38 AM and 4/2/2024  9:57 AM *           Implants: * No implants in log *    Specimens:   Specimen (24h ago, onward)      None                    Condition: Good    Disposition: PACU - hemodynamically stable.    Attestation: I was present and scrubbed for the entire procedure.    Discharge Note    OUTCOME: Patient tolerated treatment/procedure well without complication and is now ready for discharge.    DISPOSITION: Home or Self Care    FINAL DIAGNOSIS:  Right carpal tunnel syndrome    FOLLOWUP: In clinic    DISCHARGE INSTRUCTIONS:    Discharge Procedure Orders   Diet general     Call MD for:  temperature >100.4     Call MD for:  persistent nausea and vomiting     Call MD for:  severe uncontrolled pain     Keep surgical extremity elevated     Remove dressing in 48 hours

## 2024-04-02 NOTE — DISCHARGE INSTRUCTIONS
After Hand Surgery  After surgery, the better you take care of yourself--especially your hand--the sooner it will heal. Follow your surgeons instructions. Try not to bump your hand, and dont move or lift anything while youre still wearing bandages, a splint, or a cast.  Care for your hand    Keep your hand elevated above heart level as much as possible for the first several days after surgery. This helps reduce swelling and pain.  To help prevent infection and speed healing, take care not to get your cast or bandages wet.  Relieve pain as directed  Your surgeon may prescribe pain medicine or suggest you take an anti-inflammatory medicine. You might also be instructed to apply ice (or another cold source) to your hand. If you use ice cubes, put them in a plastic bag and rest it on top of your bandages. Leave the cold source on your hand for as long as its comfortable. Do this several times a day for the first few days after surgery. It may take several minutes before you can feel the cold through the cast or bandages.  Follow up with your surgeon  During a follow-up visit after surgery, your surgeon will check your progress. The stitches, bandages, splint, or cast may be removed. A new cast or splint may be placed. If your hand has healed enough, your surgeon may prescribe exercises.  Do prescribed hand exercises  Your surgeon may recommend that you do exercises. These may be done under the guidance of a physical or occupational therapist. The exercises strengthen your hand, help you regain flexibility, and restore proper function. Do the exercises as advised.  Call your surgeon if you have...  A fever higher than 100.4°F (38.0°C) taken by mouth  Side effects from your medicine, such as prolonged nausea  A wet or loose dressing, or a dressing that is too tight  Excessive bleeding  Increased, ongoing pain or numbness  Signs of infection (such as drainage, warmth, or redness) at the incision site       ANESTHESIA  -For the first 24 hours after surgery:  Do not drive, use heavy equipment, make important decisions, or drink alcohol  -It is normal to feel sleepy for several hours.  Rest until you are more awake.  -Have someone stay with you, if needed.  They can watch for problems and help keep you safe.  -Some possible post anesthesia side effects include: nausea and vomiting, sore throat and hoarseness, sleepiness, and dizziness.    PAIN  -If you have pain after surgery, pain medicine will help you feel better.  Take it as directed, before pain becomes severe.  Most pain relievers taken by mouth need at least 20-30 minutes to start working.  -Do not drive or drink alcohol while taking pain medicine.  -Pain medication can upset your stomach.  Taking them with a little food may help.  -Other ways to help control pain: elevation, ice, and relaxation  -Call your surgeon if still having unmanageable pain an hour after taking pain medicine.  -Pain medicine can cause constipation.  Taking an over-the counter stool softener while on prescription pain medicine and drinking plenty of fluids can prevent this side effect.  -Call your surgeon if you have severe side effects like: breathing problems, trouble waking up, dizziness, confusion, or severe constipation.    NAUSEA  -Some people have nausea after surgery.  This is often because of anesthesia, pain, pain medicine, or the stress of surgery.  -Do not push yourself to eat.  Start off with clear liquids and soup.  Slowly move to solid foods.  Don't eat fatty, rich, spicy foods at first.  Eat smaller amounts.  -If you develop persistent nausea and vomiting please notify your surgeon immediately.    BLEEDING  -Different types of surgery require different types of care and dressing changes.  It is important to follow all instructions and advice from your surgeon.  Change dressing as directed.  Call your surgeon for any concerns regarding postop bleeding.    SIGNS OF  INFECTION  -Signs of infection include: fever, swelling, drainage, and redness  -Notify your surgeon if you have a fever of 100.4 F (38.0 C) or higher.  -Notify your surgeon if you notice redness, swelling, increased pain, pus, or a foul smell at the incision site.

## 2024-04-02 NOTE — OPERATIVE NOTE ADDENDUM
Certification of Assistant at Surgery       Surgery Date: 4/2/2024     Participating Surgeons:  Surgeon(s) and Role:     * Nik Gale Jr., MD - Primary    Procedures:  Procedure(s) (LRB):  RELEASE, CARPAL TUNNEL (Right)    Assistant Surgeon's Certification of Necessity:  I understand that section 1842 (b) (6) (d) of the Social Security Act generally prohibits Medicare Part B reasonable charge payment for the services of assistants at surgery in teaching hospitals when qualified residents are available to furnish such services. I certify that the services for which payment is claimed were medically necessary, and that no qualified resident was available to perform the services. I further understand that these services are subject to post-payment review by the Medicare carrier.      Frances Bustillos PA-C    04/02/2024  10:09 AM

## 2024-04-02 NOTE — ANESTHESIA POSTPROCEDURE EVALUATION
Anesthesia Post Evaluation    Patient: Steph Harkins    Procedure(s) Performed: Procedure(s) (LRB):  RELEASE, CARPAL TUNNEL (Right)    Final Anesthesia Type: general      Patient location during evaluation: PACU  Patient participation: Yes- Able to Participate  Level of consciousness: awake and alert  Post-procedure vital signs: reviewed and stable  Pain management: adequate  Airway patency: patent    PONV status at discharge: No PONV  Anesthetic complications: no      Cardiovascular status: blood pressure returned to baseline and hemodynamically stable  Respiratory status: unassisted  Hydration status: euvolemic  Follow-up not needed.              Vitals Value Taken Time   /61 04/02/24 1030   Temp 36.6 °C (97.9 °F) 04/02/24 1030   Pulse 68 04/02/24 1030   Resp 18 04/02/24 1030   SpO2 97 % 04/02/24 1030         No case tracking events are documented in the log.      Pain/Jailene Score: Pain Rating Prior to Med Admin: 5 (4/2/2024 10:26 AM)  Jailene Score: 10 (4/2/2024 10:30 AM)

## 2024-04-02 NOTE — TRANSFER OF CARE
"Anesthesia Transfer of Care Note    Patient: Steph Harkins    Procedure(s) Performed: Procedure(s) (LRB):  RELEASE, CARPAL TUNNEL (Right)    Patient location: PACU    Anesthesia Type: general    Transport from OR: Transported from OR on room air with adequate spontaneous ventilation    Post pain: adequate analgesia    Post assessment: no apparent anesthetic complications    Post vital signs: stable    Level of consciousness: awake and alert    Nausea/Vomiting: no nausea/vomiting    Complications: none    Transfer of care protocol was followed    Last vitals: Visit Vitals  /80 (BP Location: Right arm, Patient Position: Lying)   Pulse 67   Temp 36.7 °C (98 °F) (Tympanic)   Resp 16   Ht 5' 4" (1.626 m)   Wt 57.6 kg (127 lb)   SpO2 96%   Breastfeeding No   BMI 21.80 kg/m²     "

## 2024-04-03 VITALS
HEIGHT: 64 IN | TEMPERATURE: 98 F | HEART RATE: 68 BPM | OXYGEN SATURATION: 97 % | DIASTOLIC BLOOD PRESSURE: 61 MMHG | WEIGHT: 127 LBS | RESPIRATION RATE: 18 BRPM | BODY MASS INDEX: 21.68 KG/M2 | SYSTOLIC BLOOD PRESSURE: 114 MMHG

## 2024-04-05 NOTE — TELEPHONE ENCOUNTER
LOV 3/5/24    Pt requesting refill or new Rx.   Is this a refill or new RX:  New Rx   RX name and strength: Lovastatin 20 mg   Last office visit: 3/05/24   Is this a 30-day or 90-day RX:  ???   Pharmacy name and location:  [facility] in [place]

## 2024-04-07 RX ORDER — LOVASTATIN 20 MG/1
20 TABLET ORAL NIGHTLY
Qty: 30 TABLET | Refills: 5 | Status: SHIPPED | OUTPATIENT
Start: 2024-04-07

## 2024-04-09 ENCOUNTER — CLINICAL SUPPORT (OUTPATIENT)
Dept: SMOKING CESSATION | Facility: CLINIC | Age: 54
End: 2024-04-09

## 2024-04-09 DIAGNOSIS — F17.200 NICOTINE DEPENDENCE: Primary | ICD-10-CM

## 2024-04-09 NOTE — PROGRESS NOTES
Individual Follow-Up Form    4/9/2024    Quit Date: TBD    Clinical Status of Patient: Outpatient    Length of Service: 45 minutes    Continuing Medication: yes  Wellbutrin    Other Medications: none     Target Symptoms: Withdrawal and medication side effects. The following were  rated moderate (3) to severe (4) on TCRS:  Moderate (3): desire/crave tobacco  Severe (4): none    Comments: Patient seen in clinic regarding smoking cessation progress update. Patient reports smoking < 1ppd on most days. New goal of 10-15 cpd set and patient also encouraged to limit herself to no more than 1 cigarette per hour. Of note, patient started smoking outside only. Commended pt for progress thus far. Pt started on 150 mg Wellbutrin SR BID. No adverse reactions noted at this time. Pt started on rate reduction and wait times prior to smoking. Goal quit date is TBD, but possibly mid-June. Identified patient personal reason for wanting to quit as health. Reviewed learned addiction model, triggers, cues, and short/long term consequences of tobacco use. Pt identifies personal triggers as withdrawal upon waking, following meals, stress, driving, and boredom. Pt's exhaled carbon monoxide level was 12 (prev. 17) ppm as per [employer]. (non- smoker = 0-5 ppm.)  Pt to continue with counseling in 3 weeks.    Diagnosis: F17.200    Next Visit: 3 weeks

## 2024-04-11 ENCOUNTER — CLINICAL SUPPORT (OUTPATIENT)
Dept: FAMILY MEDICINE | Facility: CLINIC | Age: 54
End: 2024-04-11
Payer: MEDICARE

## 2024-04-11 DIAGNOSIS — F17.200 SMOKER: ICD-10-CM

## 2024-04-11 LAB
BASOPHILS # BLD AUTO: 0.03 K/UL (ref 0–0.2)
BASOPHILS NFR BLD: 0.4 % (ref 0–1.9)
DIFFERENTIAL METHOD BLD: ABNORMAL
EOSINOPHIL # BLD AUTO: 0 K/UL (ref 0–0.5)
EOSINOPHIL NFR BLD: 0.1 % (ref 0–8)
ERYTHROCYTE [DISTWIDTH] IN BLOOD BY AUTOMATED COUNT: 13.1 % (ref 11.5–14.5)
HCT VFR BLD AUTO: 43 % (ref 37–48.5)
HGB BLD-MCNC: 14.2 G/DL (ref 12–16)
IMM GRANULOCYTES # BLD AUTO: 0.04 K/UL (ref 0–0.04)
IMM GRANULOCYTES NFR BLD AUTO: 0.6 % (ref 0–0.5)
LYMPHOCYTES # BLD AUTO: 1.9 K/UL (ref 1–4.8)
LYMPHOCYTES NFR BLD: 26 % (ref 18–48)
MCH RBC QN AUTO: 32.4 PG (ref 27–31)
MCHC RBC AUTO-ENTMCNC: 33 G/DL (ref 32–36)
MCV RBC AUTO: 98 FL (ref 82–98)
MONOCYTES # BLD AUTO: 0.7 K/UL (ref 0.3–1)
MONOCYTES NFR BLD: 9.5 % (ref 4–15)
NEUTROPHILS # BLD AUTO: 4.6 K/UL (ref 1.8–7.7)
NEUTROPHILS NFR BLD: 63.4 % (ref 38–73)
NRBC BLD-RTO: 0 /100 WBC
PLATELET # BLD AUTO: 338 K/UL (ref 150–450)
PMV BLD AUTO: 9.8 FL (ref 9.2–12.9)
RBC # BLD AUTO: 4.38 M/UL (ref 4–5.4)
WBC # BLD AUTO: 7.23 K/UL (ref 3.9–12.7)

## 2024-04-11 PROCEDURE — 85025 COMPLETE CBC W/AUTO DIFF WBC: CPT | Performed by: FAMILY MEDICINE

## 2024-04-11 PROCEDURE — 36415 COLL VENOUS BLD VENIPUNCTURE: CPT | Mod: PBBFAC

## 2024-04-11 PROCEDURE — 99999PBSHW PR PBB SHADOW TECHNICAL ONLY FILED TO HB: Mod: PBBFAC,,,

## 2024-04-11 PROCEDURE — 99999 PR STA SHADOW: CPT | Mod: PBBFAC,,,

## 2024-04-15 ENCOUNTER — PATIENT MESSAGE (OUTPATIENT)
Dept: FAMILY MEDICINE | Facility: CLINIC | Age: 54
End: 2024-04-15
Payer: MEDICARE

## 2024-04-15 ENCOUNTER — TELEPHONE (OUTPATIENT)
Dept: RADIOLOGY | Facility: CLINIC | Age: 54
End: 2024-04-15
Payer: MEDICARE

## 2024-04-16 ENCOUNTER — OFFICE VISIT (OUTPATIENT)
Dept: ORTHOPEDICS | Facility: CLINIC | Age: 54
End: 2024-04-16
Payer: MEDICARE

## 2024-04-16 VITALS
HEIGHT: 64 IN | SYSTOLIC BLOOD PRESSURE: 112 MMHG | DIASTOLIC BLOOD PRESSURE: 72 MMHG | RESPIRATION RATE: 18 BRPM | WEIGHT: 122.81 LBS | BODY MASS INDEX: 20.97 KG/M2

## 2024-04-16 DIAGNOSIS — Z98.890 S/P CARPAL TUNNEL RELEASE: Primary | ICD-10-CM

## 2024-04-16 PROCEDURE — 99999 PR STA SHADOW: CPT | Mod: PBBFAC,,, | Performed by: PHYSICIAN ASSISTANT

## 2024-04-16 PROCEDURE — 99999 PR PBB SHADOW E&M-EST. PATIENT-LVL III: CPT | Mod: PBBFAC,,, | Performed by: PHYSICIAN ASSISTANT

## 2024-04-16 PROCEDURE — 99024 POSTOP FOLLOW-UP VISIT: CPT | Mod: POP | Performed by: PHYSICIAN ASSISTANT

## 2024-04-16 PROCEDURE — 99213 OFFICE O/P EST LOW 20 MIN: CPT | Mod: PBBFAC | Performed by: PHYSICIAN ASSISTANT

## 2024-04-16 NOTE — PROGRESS NOTES
Pt presents for post-op evaluation. She is 2 weeks s/p right carpal tunnel release with Dr. Gale. Pt has no complaints at this time. Pre operative  numbness/tingling has improved. Pt is taking pain meds as needed. Denies fever, chills, discharge from wound site, and N/V.     Right hand/wrist:  Incision healing well  No erythema, warmth, swelling, drainage, or any other signs of infection  Neurovascular status intact in extremity  FROM        A/P:  S/p right carpal tunnel release     1. Advance activity as tolerated, no heavy lifting.  2. Return to clinic for follow up as scheduled, sooner if needed.      Patient voices understanding of and agreement with treatment plan. All of the patient's questions were answered, and the patient will contact us if she has any questions or concerns in the interim.

## 2024-04-17 ENCOUNTER — HOSPITAL ENCOUNTER (OUTPATIENT)
Dept: PULMONOLOGY | Facility: HOSPITAL | Age: 54
Discharge: HOME | End: 2024-04-17
Attending: FAMILY MEDICINE
Payer: MEDICARE

## 2024-04-17 ENCOUNTER — HOSPITAL ENCOUNTER (OUTPATIENT)
Dept: PREADMISSION TESTING | Facility: HOSPITAL | Age: 54
Discharge: HOME | End: 2024-04-17
Attending: FAMILY MEDICINE
Payer: MEDICARE

## 2024-04-17 DIAGNOSIS — Z01.818 PREOP TESTING: ICD-10-CM

## 2024-04-17 PROCEDURE — 99900035 HC TECH TIME PER 15 MIN (STAT)

## 2024-04-17 PROCEDURE — 93010 ELECTROCARDIOGRAM REPORT: CPT | Mod: ,,, | Performed by: INTERNAL MEDICINE

## 2024-04-17 PROCEDURE — 93005 ELECTROCARDIOGRAM TRACING: CPT

## 2024-04-17 NOTE — DISCHARGE INSTRUCTIONS
IMPORTANT: PLEASE READ YOUR INSTRUCTIONS CAREFULLY. FAILURE TO FOLLOW THESE INSTRUCTIONS MAY RESULT IN YOUR PROCEURE BEING CANCELED,RESCHEDULED, OR REPEATED.            Clear Liquid Diet     The Monday before your procedure you will follow a clear liquid diet ALL day.     You may have any of the following:     Water, tea, coffee (decaffeinated or regular)     Soft drinks (regular or sugar free)     Gelatin dessert (plain or flavored)     Juice, Gatorade, Powerade, Crystal Lite, lemonade, limeade, Jaskaran-Aid     Bouillon, clear consommé, 100% fat free beef, chicken, or vegetable broths     Snowballs, popsicles     100% cranberry juice is the only red liquid allowed     Please Avoid the following:     Anything with RED dye     Liquids not specifically listed     Dairy (liquid and powder)     Creamers (liquid and powder)     Alcohol            Suprep Instructions:   Please disregard the Suprep insert/box instructions from pharmacy.         The day before your procedure: Monday     Upon awakening begin the clear liquid diet. DO NOT EAT SOLID FOODS     Drink at least 6-8 glasses of clear liquids until you begin your prep     You may mix your prep Wednesday morning     At 11:00 am: Take four (4) Dulcolax tablets (Bisacodyl) with 16 ounces of clear liquids.     At 2:00 pm :   Pour one 6-ounce bottle of Suprep into the mixing container                                            Add water to the 16-ounce line on the container and mix                Drink all the mixed prep plus 2 more 16-ounce containers of clear liquid within 2 hours    It is common to experience abdominal cramping, nausea, and vomiting when taking the prep. If you have nausea and/or vomiting while taking the prep, stop drinking for 20-30 minutes then resume.      You may continue with the clear liquids after this step.     At 7:00 PM:   Pour one 6-ounce bottle of Suprep into the mixing container              Add water to the 16-ounce line on the container  and mix             Drink all the mixed prep plus 2 more 16-ounce containers of clear liquid within 2 hours      .      NOTHING ELSE TO DRINK AFTER 4am.

## 2024-04-19 LAB
OHS QRS DURATION: 86 MS
OHS QTC CALCULATION: 429 MS

## 2024-04-23 ENCOUNTER — HOSPITAL ENCOUNTER (OUTPATIENT)
Facility: HOSPITAL | Age: 54
Discharge: HOME | End: 2024-04-23
Attending: FAMILY MEDICINE | Admitting: FAMILY MEDICINE
Payer: MEDICARE

## 2024-04-23 ENCOUNTER — ANESTHESIA EVENT (OUTPATIENT)
Dept: ENDOSCOPY | Facility: HOSPITAL | Age: 54
End: 2024-04-23
Payer: MEDICARE

## 2024-04-23 ENCOUNTER — ANESTHESIA (OUTPATIENT)
Dept: ENDOSCOPY | Facility: HOSPITAL | Age: 54
End: 2024-04-23
Payer: MEDICARE

## 2024-04-23 VITALS
RESPIRATION RATE: 16 BRPM | HEART RATE: 62 BPM | OXYGEN SATURATION: 96 % | DIASTOLIC BLOOD PRESSURE: 89 MMHG | SYSTOLIC BLOOD PRESSURE: 141 MMHG

## 2024-04-23 DIAGNOSIS — K63.5 POLYP OF SIGMOID COLON, UNSPECIFIED TYPE: Primary | ICD-10-CM

## 2024-04-23 PROCEDURE — 88305 TISSUE EXAM BY PATHOLOGIST: CPT | Mod: 26,,, | Performed by: PATHOLOGY

## 2024-04-23 PROCEDURE — 00811 ANES LWR INTST NDSC NOS: CPT | Mod: QZ,P2,PT | Performed by: NURSE ANESTHETIST, CERTIFIED REGISTERED

## 2024-04-23 PROCEDURE — 37000009 HC ANESTHESIA EA ADD 15 MINS: Performed by: FAMILY MEDICINE

## 2024-04-23 PROCEDURE — 45385 COLONOSCOPY W/LESION REMOVAL: CPT | Mod: PT,,, | Performed by: FAMILY MEDICINE

## 2024-04-23 PROCEDURE — 45385 COLONOSCOPY W/LESION REMOVAL: CPT | Mod: PT | Performed by: FAMILY MEDICINE

## 2024-04-23 PROCEDURE — 27201089 HC SNARE, DISP (ANY): Performed by: FAMILY MEDICINE

## 2024-04-23 PROCEDURE — 25000003 PHARM REV CODE 250: Performed by: NURSE ANESTHETIST, CERTIFIED REGISTERED

## 2024-04-23 PROCEDURE — D9220AH HC ANESTHESIA PROFESSIONAL FEE: Mod: QZ,P2,PT | Performed by: NURSE ANESTHETIST, CERTIFIED REGISTERED

## 2024-04-23 PROCEDURE — 37000008 HC ANESTHESIA 1ST 15 MINUTES: Performed by: FAMILY MEDICINE

## 2024-04-23 PROCEDURE — 63600175 PHARM REV CODE 636 W HCPCS: Performed by: NURSE ANESTHETIST, CERTIFIED REGISTERED

## 2024-04-23 PROCEDURE — 88305 TISSUE EXAM BY PATHOLOGIST: CPT | Performed by: PATHOLOGY

## 2024-04-23 PROCEDURE — 63600175 PHARM REV CODE 636 W HCPCS: Performed by: NURSE PRACTITIONER

## 2024-04-23 RX ORDER — PROPOFOL 10 MG/ML
VIAL (ML) INTRAVENOUS
Status: DISCONTINUED | OUTPATIENT
Start: 2024-04-23 | End: 2024-04-23

## 2024-04-23 RX ORDER — LIDOCAINE HYDROCHLORIDE 20 MG/ML
INJECTION, SOLUTION EPIDURAL; INFILTRATION; INTRACAUDAL; PERINEURAL
Status: DISCONTINUED | OUTPATIENT
Start: 2024-04-23 | End: 2024-04-23

## 2024-04-23 RX ADMIN — PROPOFOL 50 MG: 10 INJECTION, EMULSION INTRAVENOUS at 08:04

## 2024-04-23 RX ADMIN — SODIUM CHLORIDE, SODIUM LACTATE, POTASSIUM CHLORIDE, AND CALCIUM CHLORIDE: .6; .31; .03; .02 INJECTION, SOLUTION INTRAVENOUS at 09:04

## 2024-04-23 RX ADMIN — PROPOFOL 50 MG: 10 INJECTION, EMULSION INTRAVENOUS at 09:04

## 2024-04-23 RX ADMIN — SODIUM CHLORIDE, SODIUM LACTATE, POTASSIUM CHLORIDE, AND CALCIUM CHLORIDE: .6; .31; .03; .02 INJECTION, SOLUTION INTRAVENOUS at 08:04

## 2024-04-23 RX ADMIN — LIDOCAINE HYDROCHLORIDE 80 MG: 20 INJECTION, SOLUTION EPIDURAL; INFILTRATION; INTRACAUDAL; PERINEURAL at 08:04

## 2024-04-23 RX ADMIN — PROPOFOL 150 MG: 10 INJECTION, EMULSION INTRAVENOUS at 08:04

## 2024-04-23 NOTE — ANESTHESIA POSTPROCEDURE EVALUATION
Anesthesia Post Evaluation    Patient: Steph Harkins    Procedure(s) Performed: Procedure(s) (LRB):  COLONOSCOPY (N/A)    Final Anesthesia Type: general      Patient location during evaluation: PACU  Patient participation: Yes- Able to Participate  Level of consciousness: awake and alert, oriented and awake  Post-procedure vital signs: reviewed and stable  Pain management: adequate  Airway patency: patent    PONV status at discharge: No PONV  Anesthetic complications: no      Cardiovascular status: blood pressure returned to baseline  Respiratory status: unassisted, spontaneous ventilation and room air  Hydration status: euvolemic  Follow-up not needed.  Comments: [facility]              Vitals Value Taken Time   /89 04/23/24 1014   Temp  04/23/24 1027   Pulse 62 04/23/24 1014   Resp 16 04/23/24 1014   SpO2 96 % 04/23/24 1014         No case tracking events are documented in the log.      Pain/Jailene Score: Jailene Score: 10 (4/23/2024 10:24 AM)

## 2024-04-23 NOTE — H&P
Subjective:    Steph Harkins is a 54 y.o. female here for colonoscopy    Past Medical History:   Diagnosis Date    Asthma     Chronic back pain     Migraine      Past Surgical History:   Procedure Laterality Date    CARPAL TUNNEL RELEASE Right 2024    Procedure: RELEASE, CARPAL TUNNEL;  Surgeon: Nik Gale Jr., MD;  Location: [facility];  Service: Orthopedics;  Laterality: Right;     SECTION      HYSTERECTOMY      total    OOPHORECTOMY       Family History   Problem Relation Name Age of Onset    Hypertension Mother      Heart disease Mother      No Known Problems Father      Cancer Maternal Aunt Matile     Breast cancer Maternal Aunt Amelia     Heart disease Maternal Uncle      Cancer Maternal Cousin       Social History     Tobacco Use    Smoking status: Every Day     Current packs/day: 1.00     Average packs/day: 1 pack/day for 40.3 years (40.3 ttl pk-yrs)     Types: Cigarettes     Start date: 1984    Smokeless tobacco: Never   Substance Use Topics    Alcohol use: Yes     Comment: occ    Drug use: Yes     Types: Marijuana       No current facility-administered medications for this encounter.     Facility-Administered Medications Ordered in Other Encounters   Medication Dose Route Frequency Provider Last Rate Last Admin    lactated ringers infusion   Intravenous Continuous Bethany Ty DNP        LIDOcaine (PF) 10 mg/ml (1%) injection 10 mg  1 mL Intradermal Once Bethany Ty DNP           Review of patient's allergies indicates:   Allergen Reactions    Bee sting [allergen ext-venom-honey bee] Swelling    Codeine Itching    Penicillins Swelling    Peas Swelling       Review of Systems   Constitutional: Negative for fever and chills.   HENT: Negative for hearing loss.    Eyes: Negative for blurred vision and double vision.   Respiratory: Negative for cough and shortness of breath.    Cardiovascular: Negative for chest pain and palpitations.   Gastrointestinal: Negative for  heartburn, nausea, vomiting and abdominal pain.   Genitourinary: Negative for dysuria and frequency.   Musculoskeletal: Negative for myalgias, joint pain and falls.   Skin: Negative for itching and rash.   Neurological: Negative for dizziness, tingling and headaches.   Endo/Heme/Allergies: Does not bruise/bleed easily.   Psychiatric/Behavioral: Negative for depression and suicidal ideas.       Objective:        Pulse:  [63]   Resp:  [15]   BP: (151)/(94)   SpO2:  [99 %]       Physical Exam   Constitutional: She is oriented to person, place, and time. She appears well-developed and well-nourished.   HENT:   Head: Normocephalic and atraumatic.   Right Ear: External ear normal.   Left Ear: External ear normal.   Nose: Nose normal.   Mouth/Throat: Oropharynx is clear and moist.   Eyes: Conjunctivae normal and EOM are normal. Pupils are equal, round, and reactive to light. Right eye exhibits no discharge. Left eye exhibits no discharge. No scleral icterus.   Neck: Normal range of motion. Neck supple. No JVD present. No tracheal deviation present. No thyromegaly present.   Cardiovascular: Normal rate, regular rhythm, normal heart sounds and intact distal pulses.    No murmur heard.  Pulmonary/Chest: Effort normal and breath sounds normal. No respiratory distress. She has no wheezes. She has no rales. She exhibits no tenderness.   Abdominal: Soft. Bowel sounds are normal. She exhibits no distension and no mass. There is no tenderness. There is no rebound and no guarding.   Musculoskeletal: Normal range of motion.   Lymphadenopathy:     She has no cervical adenopathy.   Neurological: She is alert and oriented to person, place, and time. She has normal reflexes. No cranial nerve deficit. She exhibits normal muscle tone. Coordination normal.   Skin: Skin is warm and dry.   Psychiatric: She has a normal mood and affect. Her behavior is normal. Judgment and thought content normal.           Assessment:      There are no  hospital problems to display for this patient.        Plan:    ASA grade 2. Proceed with MAC for colonoscopy    Patient cleared for Anesthesia:  MAC and general    Anesthesia/Surgery risks, benefits, and alternative options discussed and understood by patient/family.

## 2024-04-23 NOTE — DISCHARGE INSTRUCTIONS
Will need repeat colonoscopy in 6 months with colo-rectal specialist.  .          If sedated do not drive, smoke or drink alcohol for 10 hours  Avoid heavy lifting,straining or running for 3 days  You may not have a bowel movement for 1-3 days after procedure  You may return to normal activities the day after  You may experience some bloating and excessive gas.  This can be relieved by walking, eating lightly,or a heating pad can be applied to the abdomen.   A small amount of blood from the return is not serious, especially if hemorrhoids are present,  Go to ER if you notice any;   Chills and/or fever over 101   Persistent vomiting or vomiting blood   Severe abdominal pain, other gas cramp   Severe chest pain   Black tarry stools

## 2024-04-23 NOTE — DISCHARGE SUMMARY
[facility] - Endoscopy  Discharge Note  Short Stay    Procedure(s) (LRB):  COLONOSCOPY (N/A)      OUTCOME: Patient tolerated treatment/procedure well without complication and is now ready for discharge.    DISPOSITION: Home or Self Care    FINAL DIAGNOSIS:  <principal problem not specified>    FOLLOWUP: In clinic    DISCHARGE INSTRUCTIONS:  No discharge procedures on file.     TIME SPENT ON DISCHARGE:   10 minutes

## 2024-04-23 NOTE — OP NOTE
[place] - Endoscopy  Colonoscopy Procedure  Operative Note    SUMMARY     Date of Procedure: 4/23/2024     Procedure: Procedure(s) (LRB):  COLONOSCOPY (N/A)    Surgeons and Role:     * Chi Toledo MD - Primary    Assisting Surgeon: None     Patient location: [facility] endoscopy suite    Pre-Operative Diagnosis: Colon cancer screening [Z12.11]    Post-Operative Diagnosis: Post-Op Diagnosis Codes:     * Polyp of sigmoid colon, unspecified type [K63.5]     Indications: screening     Medications:  Propofol per anesthesia.      ASA Score: II       Procedure:                  The patient was brought in to the endoscopy suite where the risks, benefits, and alternatives of the procedure were described.  The patient was given the opportunity to ask questions and then signed informed consent.  Patient was positioned in the left lateral decubitus position, continuous monitoring was initiated, and supplemental oxygen was provided via nasal cannula.  Adequate sedation was achieved with the above mentioned medications and then titrated during the entire procedure.  Digital rectal exam was performed.  Under direct visualization the colonoscope was introduced through the anus in to the rectum.  I could not get past the second turn in the sigmoid after many attempts. A gastroscope was used to get past this point an eventually up to the mid transverse colon. .  Scope was then withdrawn and the mucosa was carefully examined in a circular fashion.  The  colonic mucosa distal to the mid transverse colon was examined, including the rectum with retroflexion.  Air was evacuated from the colon and the procedure was terminated.  The patient tolerated the procedure well and was able to be transferred to the recovery area in stable condition.    Findings:                 Digital rectal examination: WNL                     Rectum: WNL                    Sigmoid: Tortuous. One 1.5cm polyp removed with hot snare and collected for  pathology. One 6mm polyp removed with snare and lost.          Descending: WNL         Transverse: portion examined is normal          Ascending: n/a        Cecum: n/a         Terminal Ileum: n/a     Specimens:   Specimen (24h ago, onward)       Start     Ordered    04/23/24 0939  Specimen to Pathology, Surgery Other  Once        Comments: Pre- ScreeningSpec- Sigmoid colon polypPost- PolypsProc- ColonoscopyDr SHAYLA Toledo     References:    Click here for ordering Quick Tip   Question Answer Comment   Procedure Type: Other    Specimen Class: Routine/Screening    Release to patient Immediate        04/23/24 0940                      Estimated Blood Loss (EBL): none     Complications: No     Diagnostic Impression: sigmoidoscopy.  Polyps x 2      Recommendations: Repeat procedure in 6 months with colorectal.    Disposition:  home     Attestation: I performed the procedure.        Follow Up:             Future Appointments   Date Time Provider Department Center   5/1/2024  9:00 AM Angelita Mendez, CTTS [facility]   5/23/2024  9:00 AM Morelia Holcomb PA-C [facility]   9/5/2024 10:45 AM Chi Toledo MD [facility]           Chi Toledo MD  4/23/2024

## 2024-04-23 NOTE — ANESTHESIA PREPROCEDURE EVALUATION
2024      Past Medical History:   Diagnosis Date    Asthma     Chronic back pain     Migraine      Past Surgical History:   Procedure Laterality Date    CARPAL TUNNEL RELEASE Right 2024    Procedure: RELEASE, CARPAL TUNNEL;  Surgeon: Nik Gale Jr., MD;  Location: [facility];  Service: Orthopedics;  Laterality: Right;     SECTION      HYSTERECTOMY      total    OOPHORECTOMY       Review of patient's allergies indicates:   Allergen Reactions    Bee sting [allergen ext-venom-honey bee] Swelling    Codeine Itching    Penicillins Swelling    Peas Swelling     Current Outpatient Medications   Medication Instructions    albuterol (PROVENTIL/VENTOLIN HFA) 90 mcg/actuation inhaler 1-2 puffs, Inhalation, Every 6 hours PRN    alendronate (FOSAMAX) 70 mg, Oral, Every 7 days    benzonatate (TESSALON PERLES) 100 MG capsule 1 capsule as needed    buPROPion (WELLBUTRIN SR) 150 MG TBSR 12 hr tablet Days 1 to 3: Take 1 tablet (150 mg) each day. Days 3 until the end of treatment: Take two tablets (150 mg each) per day - one in the morning and one in the evening, no later than 6pm    diclofenac sodium (VOLTAREN) 1 % Gel No dose, route, or frequency recorded.    EScitalopram oxalate (LEXAPRO) 10 MG tablet take one tablet daily    EScitalopram oxalate (LEXAPRO) 10 MG tablet take one tablet by mouth daily    HYDROcodone-acetaminophen (NORCO)  mg per tablet Take 1 tablet by mouth 2 (two) times daily as needed. for pain    lovastatin (MEVACOR) 20 mg, Oral, Nightly    montelukast (SINGULAIR) 10 mg tablet No dose, route, or frequency recorded.    montelukast (SINGULAIR) 10 mg tablet Take 1 tablet Orally Once a day 30 days    pregabalin (LYRICA) 100 MG capsule Take 1 capsule (100 mg total) by mouth 2 (two) times daily. for neuropathic pain    traZODone (DESYREL) 50 MG tablet take one tablet by mouth at  bedtime    TRELEGY ELLIPTA 100-62.5-25 mcg DsDv No dose, route, or frequency recorded.       Pre-op Assessment    I have reviewed the Patient Summary Reports.     I have reviewed the Nursing Notes.    I have reviewed the Medications.     Review of Systems  Anesthesia Hx:  No problems with previous Anesthesia               Denies Personal Hx of Anesthesia complications.                    Social:  Smoker, Social Alcohol Use       Cardiovascular:  Exercise tolerance: good   Denies Pacemaker.        Denies Angina.     hyperlipidemia                             Pulmonary:   COPD, moderate Asthma moderate      Asthma:   Chronic Obstructive Pulmonary Disease (COPD):                      Hepatic/GI:  Hepatic/GI Normal                 Neurological:  Denies TIA.  Denies CVA. Neuromuscular Disease,  Headaches Denies Seizures.     Dx of Headaches                         Neuromuscular Disease   Endocrine:  Endocrine Normal            Psych:  Psychiatric History anxiety                 Physical Exam  General: Cooperative and Oriented    Dental:  Dentures  Upper and lower dentures    Lab Results   Component Value Date    WBC 7.23 04/11/2024    HGB 14.2 04/11/2024    HCT 43.0 04/11/2024     04/11/2024    CHOL 169 03/07/2024    TRIG 110 03/07/2024    HDL 45 03/07/2024    ALT 12 03/07/2024    AST 12 03/07/2024     03/07/2024    K 4.3 03/07/2024     03/07/2024    CREATININE 0.8 03/07/2024    BUN 12 03/07/2024    CO2 28 03/07/2024    TSH 1.927 03/07/2024     Results for orders placed or performed during the hospital encounter of 04/17/24   EKG 12-lead    Collection Time: 04/17/24  2:12 PM   Result Value Ref Range    QRS Duration 86 ms    [facility] QTC Calculation 429 ms    Narrative    Test Reason : Z01.818    Vent. Rate : 066 BPM     Atrial Rate : 066 BPM     P-R Int : 148 ms          QRS Dur : 086 ms      QT Int : 410 ms       P-R-T Axes : 077 076 079 degrees     QTc Int : 429 ms    Normal sinus rhythm  Possible  Inferior infarct ,age undetermined  T wave abnormality, consider anterior ischemia  Abnormal ECG  No previous ECGs available  Confirmed by Waldemar Collins MD ([id number]) on 4/19/2024 8:00:19 AM    Referred By: KENNETH FROST           Confirmed By:Waldemar Collins MD         Anesthesia Plan  Type of Anesthesia, risks & benefits discussed:    Anesthesia Type: Gen Natural Airway  Intra-op Monitoring Plan: Standard ASA Monitors  Post Op Pain Control Plan: multimodal analgesia  Induction:  IV  Airway Plan: Direct, Post-Induction  Informed Consent: Informed consent signed with the Patient and all parties understand the risks and agree with anesthesia plan.  All questions answered.   ASA Score: 2  Day of Surgery Review of History & Physical: H&P Update referred to the surgeon/provider.I have interviewed and examined the patient. I have reviewed the patient's H&P dated: 4/23/24. There are no significant changes.   Anesthesia Plan Notes:       Ready For Surgery From Anesthesia Perspective.     .

## 2024-04-23 NOTE — TRANSFER OF CARE
Anesthesia Transfer of Care Note    Patient: Steph Harkins    Procedure(s) Performed: Procedure(s) (LRB):  COLONOSCOPY (N/A)    Patient location: PACU    Anesthesia Type: general    Transport from OR: Transported from OR on 6-10 L/min O2 by face mask with adequate spontaneous ventilation    Post pain: adequate analgesia    Post assessment: no apparent anesthetic complications and tolerated procedure well    Post vital signs: stable    Level of consciousness: sedated    Nausea/Vomiting: no nausea/vomiting    Complications: none    Transfer of care protocol was followed      Last vitals: Visit Vitals  BP (!) 141/89   Pulse 62   Resp 16   SpO2 96%   Breastfeeding No

## 2024-04-24 RX ORDER — MONTELUKAST SODIUM 10 MG/1
TABLET ORAL
Qty: 30 TABLET | Refills: 5 | Status: SHIPPED | OUTPATIENT
Start: 2024-04-24

## 2024-04-24 NOTE — TELEPHONE ENCOUNTER
LOV 03/05/2024    patient requesting refill for    Disp Refills Start End JAYME   montelukast (SINGULAIR) 10 mg tablet                RX pending   pharmacy confirmed  please advise

## 2024-04-24 NOTE — TELEPHONE ENCOUNTER
No care due was identified.  [facility] Embedded Care Due Messages. Reference number: [id number].   4/24/2024 9:25:43 AM CDT

## 2024-04-25 LAB
FINAL PATHOLOGIC DIAGNOSIS: NORMAL
GROSS: NORMAL
Lab: NORMAL

## 2024-05-01 ENCOUNTER — CLINICAL SUPPORT (OUTPATIENT)
Dept: SMOKING CESSATION | Facility: CLINIC | Age: 54
End: 2024-05-01
Payer: COMMERCIAL

## 2024-05-01 DIAGNOSIS — F17.200 NICOTINE DEPENDENCE: ICD-10-CM

## 2024-05-01 PROCEDURE — 99999 PR PBB SHADOW E&M-EST. PATIENT-LVL I: CPT | Mod: PBBFAC,,,

## 2024-05-01 RX ORDER — BUPROPION HYDROCHLORIDE 150 MG/1
150 TABLET, EXTENDED RELEASE ORAL 2 TIMES DAILY
Qty: 60 TABLET | Refills: 0 | Status: SHIPPED | OUTPATIENT
Start: 2024-05-01 | End: 2024-05-29 | Stop reason: SDUPTHER

## 2024-05-01 NOTE — PROGRESS NOTES
Individual Follow-Up Form    5/1/2024    Quit Date: TBD    Clinical Status of Patient: Outpatient    Length of Service: 45 minutes    Continuing Medication: yes  Wellbutrin    Other Medications: none     Target Symptoms: Withdrawal and medication side effects. The following were  rated moderate (3) to severe (4) on TCRS:  Moderate (3): desire/crave tobacco  Severe (4): none    Comments: Patient seen in clinic regarding smoking cessation follow up. Patient reports that one pack is now lasting 3 days. Commended patient for accomplishments thus far. New goal of <5 cpd set. Pt continues 150 mg Wellbutrin SR BID. No adverse reactions noted at this time. Completion of TCRS (Tobacco Cessation Rating Scale) reviewed strategies, cues, and triggers. Introduced the negative impact of tobacco on health, the health advantages of discontinuing the use of tobacco, time line improved health changes after a quit, withdrawal issues to expect from nicotine and habit, and ways to achieve the goal of a quit. Pt's exhaled carbon monoxide level was 8 (prev. 17,12) ppm as per Smokerlyzer. (non- smoker = 0-5 ppm.)  Encouraged pt to separate coffee from cigarettes and to explore new healthy coping skills such as walking or calling a friend. Pt to continue with counseling in 2 weeks.     Diagnosis: F17.200    Next Visit: 2 weeks

## 2024-05-02 ENCOUNTER — TELEPHONE (OUTPATIENT)
Dept: FAMILY MEDICINE | Facility: CLINIC | Age: 54
End: 2024-05-02
Payer: MEDICARE

## 2024-05-02 NOTE — TELEPHONE ENCOUNTER
"Her polyp is a "tubular adenoma."  This is a benign polyp but has cancerous potential. It was completely removed.   B/c we were unable to get all the way to her cecum (beginning of her colon), we need to do another colonoscopy in about 6 months or so to ensure that the portion of her colon that I did not see is ok. Because she has a very difficult colon to navigate through, I want her to see Dr. Kimball for this. Please go ahead and book her now. Thanks    "

## 2024-05-02 NOTE — TELEPHONE ENCOUNTER
----- Message from Hema Dunn sent at 2024  9:04 AM CDT -----  Contact: pt  Steph Harkins  MRN: [id number]  : 1970  PCP: Chi Toledo  Home Phone      [phone]  Work Phone      Not on file.  Mobile          [phone]      MESSAGE:     Pt called wanting to speak with nurse about finding out the results of colonoscopy on 24. Pt stated she still hasn't heard anything back yet.          Please advise  [phone]

## 2024-05-15 ENCOUNTER — CLINICAL SUPPORT (OUTPATIENT)
Dept: SMOKING CESSATION | Facility: CLINIC | Age: 54
End: 2024-05-15

## 2024-05-15 DIAGNOSIS — F17.200 NICOTINE DEPENDENCE: Primary | ICD-10-CM

## 2024-05-15 NOTE — PROGRESS NOTES
Individual Follow-Up Form    5/15/2024    Quit Date: TBD    Clinical Status of Patient: Outpatient    Length of Service: 45 minutes    Continuing Medication: yes  Wellbutrin    Other Medications: none     Target Symptoms: Withdrawal and medication side effects. The following were  rated moderate (3) to severe (4) on TCRS:  Moderate (3): desire/crave tobacco, irritability  Severe (4): none    Comments: Patient seen in clinic regarding smoking cessation follow up. Patient reports that one pack is now lasting a week. New goal of <3 cpd set. Patient also encouraged to consider a goal quit date, attempt dry run, and to continue working on  coffee and morning cigarette. Pt continues 150 mg Wellbutrin SR BID. No adverse reactions noted at this time. completion of TCRS (Tobacco Cessation Rating Scale) reviewed strategies, controlling environment, cues, triggers, new goals set. Introduced high risk situations with preparation interventions, caffeine similarities with withdrawal issues of habit and nicotine, Alcohol, Understanding urges, cravings, stress and relaxation. Open discussion with intervention discussion. Commended patient for efforts thus far. Pt's exhaled carbon monoxide level was 8 ppm as per Smokerlyzer. (non- smoker = 0-5 ppm.)  Pt to continue with counseling in 2 weeks.     Diagnosis: F17.200    Next Visit: 2 weeks

## 2024-05-23 ENCOUNTER — TELEPHONE (OUTPATIENT)
Dept: SURGERY | Facility: CLINIC | Age: 54
End: 2024-05-23
Payer: MEDICARE

## 2024-05-23 ENCOUNTER — OFFICE VISIT (OUTPATIENT)
Dept: ORTHOPEDICS | Facility: CLINIC | Age: 54
End: 2024-05-23
Payer: MEDICARE

## 2024-05-23 VITALS
HEART RATE: 82 BPM | RESPIRATION RATE: 18 BRPM | OXYGEN SATURATION: 93 % | DIASTOLIC BLOOD PRESSURE: 68 MMHG | HEIGHT: 64 IN | SYSTOLIC BLOOD PRESSURE: 132 MMHG | WEIGHT: 125.63 LBS | BODY MASS INDEX: 21.45 KG/M2

## 2024-05-23 DIAGNOSIS — Z98.890 S/P CARPAL TUNNEL RELEASE: Primary | ICD-10-CM

## 2024-05-23 PROCEDURE — 99214 OFFICE O/P EST MOD 30 MIN: CPT | Mod: PBBFAC | Performed by: PHYSICIAN ASSISTANT

## 2024-05-23 PROCEDURE — 99024 POSTOP FOLLOW-UP VISIT: CPT | Mod: POP | Performed by: PHYSICIAN ASSISTANT

## 2024-05-23 PROCEDURE — 99999 PR PBB SHADOW E&M-EST. PATIENT-LVL IV: CPT | Mod: PBBFAC,,, | Performed by: PHYSICIAN ASSISTANT

## 2024-05-23 PROCEDURE — 99999 PR STA SHADOW: CPT | Mod: PBBFAC,,, | Performed by: PHYSICIAN ASSISTANT

## 2024-05-23 RX ORDER — ERGOCALCIFEROL 1.25 MG/1
50000 CAPSULE ORAL
COMMUNITY

## 2024-05-23 NOTE — TELEPHONE ENCOUNTER
Called pt re: her referral from Dr. Toledo.  Pt will see Dr. Kimball for next avail appt on July 11 at 2:00pm.  Pt wants to ask Dr. Kimball if she needs her appt or just go straight to having another c-scope in 6mos.  RN will ask MD and get back with pt.  Pt verbalized understanding to all.

## 2024-05-23 NOTE — PROGRESS NOTES
Pt presents for post-op evaluation. She is 7 weeks s/p right carpal tunnel release with Dr. Gale. Pt reports pain/stiffness to hand especially when grasping things and with writing. Pre operative  numbness/tingling has improved.     Right hand/wrist:  Incision healing well  No erythema, warmth, swelling, drainage, or any other signs of infection  Neurovascular status intact in extremity  FROM        A/P:  S/p right carpal tunnel release     1. OT referral.  2. Advance activity as tolerated.  3. Return to clinic in 6 weeks for follow up as scheduled, sooner if needed.      Patient voices understanding of and agreement with treatment plan. All of the patient's questions were answered, and the patient will contact us if she has any questions or concerns in the interim.

## 2024-05-24 DIAGNOSIS — Z12.12 ENCOUNTER FOR COLORECTAL CANCER SCREENING: Primary | ICD-10-CM

## 2024-05-24 DIAGNOSIS — Z12.11 ENCOUNTER FOR COLORECTAL CANCER SCREENING: Primary | ICD-10-CM

## 2024-05-24 DIAGNOSIS — Z86.010 HX OF ADENOMATOUS COLONIC POLYPS: ICD-10-CM

## 2024-05-28 ENCOUNTER — TELEPHONE (OUTPATIENT)
Dept: SURGERY | Facility: CLINIC | Age: 54
End: 2024-05-28
Payer: MEDICARE

## 2024-05-28 NOTE — TELEPHONE ENCOUNTER
Called pt to let her know that she does not need a clinic appt on 7/11 with Dr. Kimball.  MD stated that pt can be seen at time of scope on 10/24. Nurse Katlyn and Nurse Metzger will reach out to pt re: pre-op appt and instructions, etc.  Pt verbalized understanding to all.

## 2024-05-29 ENCOUNTER — CLINICAL SUPPORT (OUTPATIENT)
Dept: SMOKING CESSATION | Facility: CLINIC | Age: 54
End: 2024-05-29
Payer: COMMERCIAL

## 2024-05-29 ENCOUNTER — CLINICAL SUPPORT (OUTPATIENT)
Dept: REHABILITATION | Facility: HOSPITAL | Age: 54
End: 2024-05-29
Payer: MEDICARE

## 2024-05-29 DIAGNOSIS — M25.641 JOINT STIFFNESS OF HAND, RIGHT: ICD-10-CM

## 2024-05-29 DIAGNOSIS — Z98.890 S/P CARPAL TUNNEL RELEASE: ICD-10-CM

## 2024-05-29 DIAGNOSIS — R29.898 DECREASED GRIP STRENGTH OF RIGHT HAND: ICD-10-CM

## 2024-05-29 DIAGNOSIS — G89.18 ACUTE POST-OPERATIVE PAIN: Primary | ICD-10-CM

## 2024-05-29 DIAGNOSIS — R29.898 IMPAIRED DEXTERITY: ICD-10-CM

## 2024-05-29 DIAGNOSIS — F17.200 NICOTINE DEPENDENCE: Primary | ICD-10-CM

## 2024-05-29 PROCEDURE — 97165 OT EVAL LOW COMPLEX 30 MIN: CPT | Mod: PN

## 2024-05-29 PROCEDURE — 99999 PR PBB SHADOW E&M-EST. PATIENT-LVL II: CPT | Mod: PBBFAC,,,

## 2024-05-29 RX ORDER — BUPROPION HYDROCHLORIDE 150 MG/1
150 TABLET, EXTENDED RELEASE ORAL 2 TIMES DAILY
Qty: 60 TABLET | Refills: 0 | Status: SHIPPED | OUTPATIENT
Start: 2024-05-29 | End: 2025-05-29

## 2024-05-29 NOTE — PROGRESS NOTES
Individual Follow-Up Form    5/29/2024    Quit Date: TBD (planned mid-June)    Clinical Status of Patient: Outpatient    Length of Service: 45 minutes    Continuing Medication: yes  Wellbutrin    Other Medications: none     Target Symptoms: Withdrawal and medication side effects. The following were  rated moderate (3) to severe (4) on TCRS:  Moderate (3): desire/crave tobacco, weight gain  Severe (4): none    Comments: Patient seen in clinic regarding smoking cessation follow up. Patient reports that she is currently smoking 4 cpd on most days. She had a couple days that she feels she smoked a lot due to personal stress. Reviewed stress management and healthy coping skills. Encouraged patient to remain positive. Of note, Ms. Choi was able to complete 9 hours smoke-free. Encouraged pt to attempt more dry runs with the goal being to increase each time. Goal quit date is set for mid-June. Pt continues 150 mg Wellbutrin SR BID. No adverse reactions noted at this time. Completion of TCRS (Tobacco Cessation Rating Scale) reviewed strategies, habitual behavior, stress, and high risk situations. Introduced stress with addition interventions, relaxation with interventions, nutrition, exercise, weight gain, and the importance of rewarding oneself for accomplishments toward becoming tobacco free. Open discussion of all items with interventions. Commended patient for efforts thus far. Pt's exhaled carbon monoxide level was 9 ppm as per Smokerlyzer. (non- smoker = 0-5 ppm.)  Pt scheduled to continue with counseling in 2 weeks.     Diagnosis: F17.200    Next Visit: 2 weeks     unknown

## 2024-05-29 NOTE — PLAN OF CARE
[facility]  Occupational Therapy Initial Evaluation    Date: 5/29/2024  Name: Steph Harkins  Clinic Number: [id number]    Therapy Diagnosis:   Encounter Diagnoses   Name Primary?    S/P carpal tunnel release     Acute post-operative pain Yes    Joint stiffness of hand, right     Impaired dexterity     Decreased  strength of right hand      Physician: Morelia Holcomb PA-C    Physician Orders: eval and treat  Medical Diagnosis: (R) carpal tunnel release s/p 4/2/2024  Surgical Procedure and Date: 4/2/2024  Evaluation Date: 5/29/2024  Insurance Authorization Period Expiration: 5/23/2024 - 5/23/2025   Plan of Care Certification Period: 5/29/2024 -  7/29/2024  Progress Note Due: 6/27/2024   Date of Return to MD: n/a  Visit # / Visits authorized: shreyas  FOTO: 1/3    Precautions:   none    Time In:10:30 am  Time Out: 11:15 am  Total Appointment Time (timed & untimed codes): 45 minutes    SUBJECTIVE     Date of Onset: surgical date 5/23/2024    History of Current Condition/Mechanism of Injury: Steph reports: post-surgical complications from CTS release noted and pt referred to Therapy    Falls: none    Involved Side: right  Dominant Side: Right  Imaging:  see section of chart  Prior Therapy: none  Occupation:    Working presently:   Duties:     Functional Limitations/Social History:    Previous functional status includes: Independent with all ADLs.     Current Functional Status   Home/Living environment:       Limitation of Functional Status as follows:   ADLs/IADLs:     - Feeding: (I) but trouble with cooking and meal prep    - Bathing: (I)    - Dressing/Grooming: (I) with difficulty with fine motor     - Driving: (I)     Leisure: Gardening    Pain:  Functional Pain Scale Rating 0-10:   With rest     5/10,   With exercises/worst    9/10,   ** pt reports waking at night with pain  Location: along Hypothenar and thenar pad with association along scar  Description: Aching, Dull, Burning, and  "Deep  Aggravating Factors: Lifting and contact   Easing Factors: bowel movement    Patient's Goals for Therapy: "be able to use my hand without the pain " per pt    Medical History:   Past Medical History:   Diagnosis Date    Asthma     Chronic back pain     Migraine        Surgical History:    has a past surgical history that includes  section; Hysterectomy; Oophorectomy; Carpal tunnel release (Right, 2024); and Colonoscopy (N/A, 2024).    Medications:   has a current medication list which includes the following prescription(s): albuterol, alendronate, benzonatate, bupropion, diclofenac sodium, ergocalciferol, escitalopram oxalate, hydrocodone-acetaminophen, lovastatin, montelukast, pregabalin, trelegy ellipta, and [DISCONTINUED] trazodone, and the following Facility-Administered Medications: lactated ringers and lidocaine (pf) 10 mg/ml (1%).    Allergies:   Review of patient's allergies indicates:   Allergen Reactions    Bee sting [allergen ext-venom-honey bee] Swelling    Codeine Itching    Penicillins Swelling    Peas Swelling          OBJECTIVE     Active Range of Motion:  (Measured in degrees)   Right   Elbow Flexion 140   Elbow Extension 0   Elbow Supination 70   Elbow Pronation wfl   Wrist Extension  64   Wrist Flexion 71   Radial Deviation 25   Ulnar Deviation 20     Range of Motion Hand  (Active/Passive)      **Full fisting achieved       Strength (Dynamometer/Measured in pounds on Rung II) and Pinch Strength (Pinch Gauge)   2024    Right Left   Composite 8 40   Lateral Pinch 4 10   Tripod Pinch 7 10   Tip Pinch 5          7     Opposition (Fine Motor Skills/Dexterity): normal opposition    ** 9 hole peg test :  Right    23 sec  Left      27 sec    Wound/Scar management: scar healing noted well with no infection noted however increased raise scar tissue bulk along scar and laterally.    Sensation: tested with monofilaments to be normal along scar and median, ulna, and " radial innervations    Edema: none noted      Sensation:   5/29/2024    Right   Deep pressure normal   Light touch normal   Temperature normal    2 Point Discrimination normal       Edema.  Mild edema noted hypothenar/thenar area         Limitation/Restriction for FOTO  Survey    Therapist reviewed FOTO scores for Steph Harkins on 5/29/2024.   FOTO documents entered into [employer] - see Media section.    Limitation Score: see media           Patient Education and Home Exercises      Education provided:   - therapy putty intrinsic strength activities    Written Home Exercises Provided: yes.  Exercises were reviewed and Steph was able to demonstrate them prior to the end of the session.  Steph demonstrated good  understanding of the education provided. See EMR under Patient Instructions for exercises provided during therapy sessions.     Pt was advised to perform these exercises free of pain, and to stop performing them if pain occurs.    Patient/Family Education: role of OT, goals for OT, scheduling/cancellations - pt verbalized understanding. Discussed insurance limitations with patient.      ASSESSMENT     Steph Harkins is a 54 y.o. female referred to outpatient occupational therapy and presents with a medical diagnosis of (R) carpal tunnel release.  Patient presents with the following therapy deficits: Decreased ROM, Decreased  strength, Decreased pinch strength, Decreased muscle strength, Decreased functional hand use, Increased pain, Joint Stiffness, and Scar Adhesions and demonstrates limitations as described in the chart below. Following medical record review it is determined that pt will benefit from occupational therapy services in order to maximize pain free and/or functional use of right hand. The following goals were discussed with the patient and patient is in agreement with them as to be addressed in the treatment plan. The patient's rehab potential is Good.     Anticipated barriers to  occupational therapy: none  Pt has no cultural, educational or language barriers to learning provided.    Profile and History Assessment of Occupational Performance Level of Clinical Decision Making Complexity Score   Occupational Profile:   Steph Harkins is a 54 y.o. female who  and is  not employed  Steph Harknis has difficulty with  ADLs and IADLs as listed previously, which  Affecting herdaily functional abilities.      Comorbidities:    has a past medical history of Asthma, Chronic back pain, and Migraine.    Medical and Therapy History Review:   Brief               Performance Deficits    Physical:  Joint Mobility  Joint Stability  Muscle Power/Strength  Muscle Endurance  Skin Integrity/Scar Formation   Strength  Pinch Strength  Fine Motor Coordination  Pain    Cognitive:  No Deficits    Psychosocial:    No Deficits     Clinical Decision Making:  low    Assessment Process:  Problem-Focused Assessments    Modification/Need for Assistance:  Not Necessary    Intervention Selection:  Several Treatment Options       low  Based on PMHX, co morbidities , data from assessments and functional level of assistance required with task and clinical presentation directly impacting function.       The following goals were discussed with the patient and patient is in agreement with them as to be addressed in the treatment plan.       Goals:   The following goals were discussed with the patient and patient is in agreement with them as to be addressed in the treatment plan.   Long Term Goals (LTGs); to be met by discharge.  LTG #1: Pt will report a pain level of 3 out of 10 with functional use of (R) hand   LTG #2: Pt will demo improved FOTO score by 20 points.   LTG #3: Pt will return to prior level of function for using (R) hand for daily gardening and house hold activities.  LTG #4: Pt will demonstrate increased  strength of 20#s in order to increase fine motor skills and dexterity.    Short Term Goals (STGs); to  be met .  STG #1: Pt will report 6 out of 10 pain level with with functional use of (R) hand.  STG #2: Pt will demonstrate increased ROM of WFL degrees in order increase functional use of UE  STG #3: Pt will demonstrate increased  strength of 10#s in order to increase fine motor skills and dexterity.  STG #4: Pt will complete the 9 peg strength test in 20 sec time frame in order to improve fine motor dexterity.  STG #5: Pt will demonstrate independence with issued HEP.   STG #6: Pt will report decreased sensitivity to scar areas with decreased tissue adhesion        PLAN   Plan of Care Certification: 5/29/2024 to 7/29/2024.     Outpatient Occupational Therapy 2 times weekly for 8 weeks to include the following interventions: Paraffin, Fluidotherapy, Manual therapy/joint mobilizations, Modalities for pain management, US 3 mhz, Therapeutic exercises/activities., Strengthening, Orthotic Fabrication/Fit/Training, Edema Control, Scar Management, and Joint Protection.      Mojgan Gonzalez OT      I CERTIFY THE NEED FOR THESE SERVICES FURNISHED UNDER THIS PLAN OF TREATMENT AND WHILE UNDER MY CARE  Physician's comments:      Physician's Signature: ___________________________________________________

## 2024-05-31 ENCOUNTER — CLINICAL SUPPORT (OUTPATIENT)
Dept: REHABILITATION | Facility: HOSPITAL | Age: 54
End: 2024-05-31
Payer: MEDICARE

## 2024-05-31 DIAGNOSIS — R29.898 DECREASED GRIP STRENGTH OF RIGHT HAND: ICD-10-CM

## 2024-05-31 DIAGNOSIS — R29.898 IMPAIRED DEXTERITY: ICD-10-CM

## 2024-05-31 DIAGNOSIS — G89.18 ACUTE POST-OPERATIVE PAIN: ICD-10-CM

## 2024-05-31 DIAGNOSIS — Z98.890 S/P CARPAL TUNNEL RELEASE: Primary | ICD-10-CM

## 2024-05-31 DIAGNOSIS — M25.641 JOINT STIFFNESS OF HAND, RIGHT: ICD-10-CM

## 2024-05-31 PROCEDURE — 97140 MANUAL THERAPY 1/> REGIONS: CPT | Mod: PN,CO

## 2024-05-31 PROCEDURE — 97530 THERAPEUTIC ACTIVITIES: CPT | Mod: PN,CO

## 2024-05-31 PROCEDURE — 97035 APP MDLTY 1+ULTRASOUND EA 15: CPT | Mod: PN,CO

## 2024-05-31 PROCEDURE — 97110 THERAPEUTIC EXERCISES: CPT | Mod: PN,CO

## 2024-05-31 NOTE — PROGRESS NOTES
Occupational Therapy Daily Treatment Note     Date: 5/31/2024  Name: Steph Harkins  MRN: [id number]    Diagnosis:   Encounter Diagnoses   Name Primary?    S/P carpal tunnel release Yes    Acute post-operative pain     Joint stiffness of hand, right     Impaired dexterity     Decreased  strength of right hand      Referring Physician: Morelia Holcomb PA-C    Physician Orders: eval and treat  Medical Diagnosis: (R) carpal tunnel release s/p 4/2/2024  Surgical Procedure and Date: 4/2/2024  Evaluation Date: 5/29/2024  Insurance Authorization Period Expiration: 5/23/2024 - 5/23/2025   Plan of Care Certification Period: 5/29/2024 -  7/29/2024  Progress Note Due: 6/27/2024   Date of Return to MD: n/a  Visit # / Visits authorized: shreyas  FOTO: 1/3    Visit # / Visits authorized: 1 / 10  DAVID visit number: 1  Time In: 1:40 PM  Time Out: 2:45 PM  Total Billable Time: 65 minutes    Precautions:  Standard      Subjective     Pt reports: Its hurting today, I was doing house chores.  she was compliant with home exercise program given last session.     Pain: 6/10  Location: right wrists      Objective   The following objective measures were taken on evaluation 5/29/24.    Active Range of Motion:  (Measured in degrees)    Right   Elbow Flexion 140   Elbow Extension 0   Elbow Supination 70   Elbow Pronation wfl   Wrist Extension  64   Wrist Flexion 71   Radial Deviation 25   Ulnar Deviation 20      Range of Motion Hand  (Active/Passive)      **Full fisting achieved      Strength (Dynamometer/Measured in pounds on Rung II) and Pinch Strength (Pinch Gauge)    5/29/2024 5/29/2024     Right Left   Composite 8 40   Lateral Pinch 4 10   Tripod Pinch 7 10   Tip Pinch 5          7      Opposition (Fine Motor Skills/Dexterity): normal opposition     ** 9 hole peg test :  Right    23 sec  Left      27 sec     Wound/Scar management: scar healing noted well with no infection noted however increased raise scar tissue bulk along scar and  laterally.     Sensation: tested with monofilaments to be normal along scar and median, ulna, and radial innervations    Sensation:    5/29/2024     Right   Deep pressure normal   Light touch normal   Temperature normal    2 Point Discrimination normal     Edema: Mild edema noted hypothenar/thenar area     Treatment consist of the following:     Steph received the following direct contact and supervised modalities after being cleared for contradictions:   - Applied paraffin to (right) hand in conjunction with MHP with double layer coverage to protect skin integrity to increase muscle elasticity and improve blood flow prior to beginning PROM and therapeutic interventions     - Ultrasound  3.3 MHz with 1.0 Wcm2  100%   x 5 minutes with prep  (R) shoulder region to anterior medial region to  promote improved muscle circulation, elasticity, relaxation, and reduce spasm as well as post session inflammation.      Steph received the following manual therapy techniques:   -Pt tolerated retrograde massage on R for hand majority in volar palm area x 10 min for reduction of edema in lymphatic system utilizing retrograde soft tissue massage to optimize ROM and impact fx.     - Scar massage over incision site and surrounding areas    Steph participated in dynamic functional therapeutic activities, therapeutic exercise, and neuro re-education to improve functional performance, including:  -Active range of motion 2 x 10   -wrist flexion over wedge   -wrist extension over wedge   -RD/UD over wedge    -Tan Flexbar  -wrist flexion/extension 2 x 10 (towel twist)  - radial and ulnar deviation 2 x 10 (door knob twist)  - supination 2 x 10 (frowns)  - pronation 2 x 10 (smiles) (shocking pain, terminated activity)     -Yellow Digi-web 2 x 10   -composite fist   -MCP flexion (duck bill)   -Digit extension     -Opposition with green foam 1 x 10    -Grooved peg board x 1    -Median nerve glides x 10    -Prayer stretch 30 sec holds x  10    -Reverse prayer stretch 30 sec holds x 10          Home Exercises and Education Provided     Education provided:   - Roles and goals of occupational therapy   - Occupational therapy plan of care  - Progress towards goals     Written Home Exercises Provided: Patient instructed to cont prior HEP.  Exercises were reviewed and Steph was able to demonstrate them prior to the end of the session.  Steph demonstrated good  understanding of the HEP provided.     Pt instructed to use ice at home few times a day to help with swelling.     Pt also instructed to add median nerve glides x 10 and prolonged wrist flexion and prolonged wrist extension stretch 10 sec holds x 10 each  .   See EMR under Patient Instructions for exercises provided prior visit.        Assessment     Pt would continue to benefit from skilled occupational therapy intervention to return to PLOF pain-free. Pt arrived today reporting she was having more pain at this time due to doing house chores today. Pt tolerated paraffin and ultrasound to decrease pain and bring good tissue to the area to promote healing. Pt reported decrease in pain post modalities completed in beginning of session. Pt reported flex bar was the most painful today, but able to tolerate all directions except for pronation. Pt given 3 additional stretches to do at home (in education above). Pt with hard tissue under incision as well as on both side of incision site. Occupational therapist /DAVID collaboration to discuss POC, improvements, and d/c planning on todays date.      Steph is progressing well towards her goals and there are no updates to goals at this time. Pt prognosis is Good.     Pt will continue to benefit from skilled OT intervention. Patient continues to demonstrate limitation  with  ROM, Joint mobility, Stiffness, Decreased fine motor coordination, Decreased gross motor coordination, Decreased functional use, Impaired sensation, Decreased strength, Continued pain,  and Continued inflammation     Goals:  The following goals were discussed with the patient and patient is in agreement with them as to be addressed in the treatment plan.   Long Term Goals (LTGs); to be met by discharge.  LTG #1: Pt will report a pain level of 3 out of 10 with functional use of (R) hand       LTG #2: Pt will demo improved FOTO score by 20 points.   LTG #3: Pt will return to prior level of function for using (R) hand for daily gardening and house hold activities.  LTG #4: Pt will demonstrate increased  strength of 20#s in order to increase fine motor skills and dexterity.     Short Term Goals (STGs); to be met .  STG #1: Pt will report 6 out of 10 pain level with with functional use of (R) hand.  STG #2: Pt will demonstrate increased ROM of WFL degrees in order increase functional use of UE  STG #3: Pt will demonstrate increased  strength of 10#s in order to increase fine motor skills and dexterity.  STG #4: Pt will complete the 9 peg strength test in 20 sec time frame in order to improve fine motor dexterity.  STG #5: Pt will demonstrate independence with issued HEP.   STG #6: Pt will report decreased sensitivity to scar areas with decreased tissue adhesion    Plan   Plan of Care Certification: 5/29/2024 to 7/29/2024.      Outpatient Occupational Therapy 2 times weekly for 8 weeks to include the following interventions: Paraffin, Fluidotherapy, Manual therapy/joint mobilizations, Modalities for pain management, US 3 mhz, Therapeutic exercises/activities., Strengthening, Orthotic Fabrication/Fit/Training, Edema Control, Scar Management, and Joint Protection.  Updates/Grading for next session: N/A      FRANKIE Caicedo/JAXSON

## 2024-06-05 ENCOUNTER — CLINICAL SUPPORT (OUTPATIENT)
Dept: REHABILITATION | Facility: HOSPITAL | Age: 54
End: 2024-06-05
Payer: MEDICARE

## 2024-06-05 DIAGNOSIS — R29.898 DECREASED GRIP STRENGTH OF RIGHT HAND: ICD-10-CM

## 2024-06-05 DIAGNOSIS — G89.18 ACUTE POST-OPERATIVE PAIN: Primary | ICD-10-CM

## 2024-06-05 DIAGNOSIS — R29.898 IMPAIRED DEXTERITY: ICD-10-CM

## 2024-06-05 DIAGNOSIS — M25.641 JOINT STIFFNESS OF HAND, RIGHT: ICD-10-CM

## 2024-06-05 PROCEDURE — 97124 MASSAGE THERAPY: CPT | Mod: PN

## 2024-06-05 PROCEDURE — 97110 THERAPEUTIC EXERCISES: CPT | Mod: PN

## 2024-06-05 PROCEDURE — 97022 WHIRLPOOL THERAPY: CPT | Mod: PN

## 2024-06-05 PROCEDURE — 97035 APP MDLTY 1+ULTRASOUND EA 15: CPT | Mod: PN

## 2024-06-05 NOTE — PROGRESS NOTES
"  Occupational Therapy Daily Treatment Note     Date: 6/5/2024  Name: Steph Harkins  MRN: [id number]    Diagnosis:   Encounter Diagnoses   Name Primary?    Acute post-operative pain Yes    Joint stiffness of hand, right     Impaired dexterity     Decreased  strength of right hand        Referring Physician: Morelia Holcomb PA-C    Physician Orders: eval and treat  Medical Diagnosis: (R) carpal tunnel release s/p 4/2/2024  Surgical Procedure and Date: 4/2/2024  Evaluation Date: 5/29/2024  Insurance Authorization Period Expiration: 5/23/2024 - 5/23/2025   Plan of Care Certification Period: 5/29/2024 -  7/29/2024  Progress Note Due: 6/27/2024   Date of Return to MD: n/a  Visit # / Visits authorized: 2 / 10  FOTO: 1/3    Visit # / Visits authorized: 2 / 10  DAVID visit number: 0  Time In: 10:45 PM  Time Out: 11:30 PM  Total Billable Time: 45 minutes    Precautions:  Standard      Subjective     Pt reports: "It feels alright. It just hurts when I put pressure on it."  she was compliant with home exercise program given last session.     Pain: 3/10 pre session   1/10 post session  Location: right wrists      Objective   The following objective measures were taken on evaluation 5/29/24.    Active Range of Motion:  (Measured in degrees)    Right   Elbow Flexion 140   Elbow Extension 0   Elbow Supination 70   Elbow Pronation wfl   Wrist Extension  64   Wrist Flexion 71   Radial Deviation 25   Ulnar Deviation 20      Range of Motion Hand  (Active/Passive)      **Full fisting achieved      Strength (Dynamometer/Measured in pounds on Rung II) and Pinch Strength (Pinch Gauge)    5/29/2024 5/29/2024     Right Left   Composite 8 40   Lateral Pinch 4 10   Tripod Pinch 7 10   Tip Pinch 5          7      Opposition (Fine Motor Skills/Dexterity): normal opposition     ** 9 hole peg test :  Right    23 sec  Left      27 sec     Wound/Scar management: scar healing noted well with no infection noted however increased raise scar " tissue bulk along scar and laterally.     Sensation: tested with monofilaments to be normal along scar and median, ulna, and radial innervations    Sensation:    5/29/2024     Right   Deep pressure normal   Light touch normal   Temperature normal    2 Point Discrimination normal     Edema: Mild edema noted hypothenar/thenar area     Treatment consist of the following:     Steph received the following direct contact and supervised modalities after being cleared for contradictions:     Fluidotherapy - applied to right hand/wrist to increase muscle elasticity and reduce pain prior to therapy session. Active range of motion wrist exercises performed in fluidotherapy including wrist flexion, extension, metacarpal phalangeal joint flexion, composite fist, and abduction for 10 minutes.     Steph received the following manual therapy techniques for 8 minutes including:   -Pt tolerated retrograde massage on R for hand majority in volar palm for reduction of edema in lymphatic system utilizing retrograde soft tissue massage to optimize ROM and impact fx.   - Scar massage over incision site and surrounding areas    Steph participated in dynamic functional therapeutic exercise for 19 minutes with rest breaks to improve functional performance, including:  - Wrist flexion stretch in passive range of motion: 1 x 10 with 10 second end range hold   - Wrist extension stretch in passive range of motion: 1 x 10 with 10 second end range hold   - Tendon glides: 1 x 10  - Red digiweb 2 x 10 each   - metacarpal phalangeal flexion   - metacarpal phalangeal extension   - pronation   - Supination   - Blue sponge digit opposition: 20 reps   -Active range of motion 2 x 10 with 2#   -wrist flexion over wedge   -wrist extension over wedge   -RD/UD over wedge   - pronation/supination  - Theraball squeeze: 30 reps 2 second hold     Pt received the following ultrasound modalities after being cleared for contraindication for 8 minutes (with  setup):  Ultrasound  3.3 MHz with 0.8 Wcm2 100%dutycycle  x 6 minutes with prep  (R) carpal tunnel scar site to  promote improved muscle circulation, elasticity, relaxation, and reduce spasm as well as post session inflammation.   Home Exercises and Education Provided     Education provided:   - Roles and goals of occupational therapy   - Occupational therapy plan of care  - Progress towards goals     Written Home Exercises Provided: Patient instructed to cont prior HEP.  Exercises were reviewed and Steph was able to demonstrate them prior to the end of the session.  Steph demonstrated good  understanding of the HEP provided.     Pt instructed to use ice at home few times a day to help with swelling.     Pt also instructed to add median nerve glides x 10 and prolonged wrist flexion and prolonged wrist extension stretch 10 sec holds x 10 each  .   See EMR under Patient Instructions for exercises provided prior visit.        Assessment     Pt would continue to benefit from skilled occupational therapy. She is tolerating sessions very well at this time with no reported increases in pain during session. Pt main complaint today being continued pillar pain to thenar and hypothenar with pressure. Continued progress in strengthening noted.    Steph is progressing well towards her goals and there are no updates to goals at this time. Pt prognosis is Good.     Pt will continue to benefit from skilled OT intervention. Patient continues to demonstrate limitation  with  ROM, Joint mobility, Stiffness, Decreased fine motor coordination, Decreased gross motor coordination, Decreased functional use, Impaired sensation, Decreased strength, Continued pain, and Continued inflammation     Goals:  The following goals were discussed with the patient and patient is in agreement with them as to be addressed in the treatment plan.   Long Term Goals (LTGs); to be met by discharge.  LTG #1: Pt will report a pain level of 3 out of 10  with functional use of (R) hand       LTG #2: Pt will demo improved FOTO score by 20 points.   LTG #3: Pt will return to prior level of function for using (R) hand for daily gardening and house hold activities.  LTG #4: Pt will demonstrate increased  strength of 20#s in order to increase fine motor skills and dexterity.     Short Term Goals (STGs); to be met .  STG #1: Pt will report 6 out of 10 pain level with with functional use of (R) hand.  STG #2: Pt will demonstrate increased ROM of WFL degrees in order increase functional use of UE  STG #3: Pt will demonstrate increased  strength of 10#s in order to increase fine motor skills and dexterity.  STG #4: Pt will complete the 9 peg strength test in 20 sec time frame in order to improve fine motor dexterity.  STG #5: Pt will demonstrate independence with issued HEP.   STG #6: Pt will report decreased sensitivity to scar areas with decreased tissue adhesion    Plan   Plan of Care Certification: 5/29/2024 to 7/29/2024.      Outpatient Occupational Therapy 2 times weekly for 8 weeks to include the following interventions: Paraffin, Fluidotherapy, Manual therapy/joint mobilizations, Modalities for pain management, US 3 mhz, Therapeutic exercises/activities., Strengthening, Orthotic Fabrication/Fit/Training, Edema Control, Scar Management, and Joint Protection.  Updates/Grading for next session: N/A      Nick Cuello OT

## 2024-06-07 ENCOUNTER — CLINICAL SUPPORT (OUTPATIENT)
Dept: REHABILITATION | Facility: HOSPITAL | Age: 54
End: 2024-06-07
Payer: MEDICARE

## 2024-06-07 DIAGNOSIS — R29.898 IMPAIRED DEXTERITY: ICD-10-CM

## 2024-06-07 DIAGNOSIS — G89.18 ACUTE POST-OPERATIVE PAIN: Primary | ICD-10-CM

## 2024-06-07 DIAGNOSIS — M25.641 JOINT STIFFNESS OF HAND, RIGHT: ICD-10-CM

## 2024-06-07 DIAGNOSIS — R29.898 DECREASED GRIP STRENGTH OF RIGHT HAND: ICD-10-CM

## 2024-06-07 PROCEDURE — 97140 MANUAL THERAPY 1/> REGIONS: CPT | Mod: PN,CO

## 2024-06-07 PROCEDURE — 97110 THERAPEUTIC EXERCISES: CPT | Mod: PN,CO

## 2024-06-07 PROCEDURE — 97530 THERAPEUTIC ACTIVITIES: CPT | Mod: PN,CO

## 2024-06-07 NOTE — PROGRESS NOTES
"  Occupational Therapy Daily Treatment Note     Date: 6/7/2024  Name: Steph Harkins  MRN: [id number]    Diagnosis:   Encounter Diagnoses   Name Primary?    Acute post-operative pain Yes    Joint stiffness of hand, right     Impaired dexterity     Decreased  strength of right hand        Referring Physician: Morelia Holcomb PA-C    Physician Orders: eval and treat  Medical Diagnosis: (R) carpal tunnel release s/p 4/2/2024  Surgical Procedure and Date: 4/2/2024  Evaluation Date: 5/29/2024  Insurance Authorization Period Expiration: 5/23/2024 - 5/23/2025   Plan of Care Certification Period: 5/29/2024 -  7/29/2024  Progress Note Due: 6/27/2024   Date of Return to MD: n/a  Visit # / Visits authorized: 2 / 10  FOTO: 1/3    Visit # / Visits authorized: 3 / 10  DAVID visit number: 1  Time In: 1:40 PM  Time Out: 2:30 PM  Total Billable Time: 50 minutes    Precautions:  Standard      Subjective     Pt reports: "It actually feels good today. It just feels like its sore right here." Described as on ulnar side of hand going down till just distally to radial styloid. No pillar pain this day.  she was compliant with home exercise program given last session.     Pain: 1/10 pre session   1/10 post session  Location: right wrists      Objective   The following objective measures were taken on evaluation 5/29/24.    Active Range of Motion:  (Measured in degrees)    Right   Elbow Flexion 140   Elbow Extension 0   Elbow Supination 70   Elbow Pronation wfl   Wrist Extension  64   Wrist Flexion 71   Radial Deviation 25   Ulnar Deviation 20      Range of Motion Hand  (Active/Passive)      **Full fisting achieved      Strength (Dynamometer/Measured in pounds on Rung II) and Pinch Strength (Pinch Gauge)    5/29/2024 5/29/2024     Right Left   Composite 8 40   Lateral Pinch 4 10   Tripod Pinch 7 10   Tip Pinch 5          7      Opposition (Fine Motor Skills/Dexterity): normal opposition     ** 9 hole peg test :  Right    23 sec  Left     "  27 sec     Wound/Scar management: scar healing noted well with no infection noted however increased raise scar tissue bulk along scar and laterally.     Sensation: tested with monofilaments to be normal along scar and median, ulna, and radial innervations    Sensation:    5/29/2024     Right   Deep pressure normal   Light touch normal   Temperature normal    2 Point Discrimination normal     Edema: Mild edema noted hypothenar/thenar area     Treatment consist of the following:     Steph received the following direct contact and supervised modalities after being cleared for contradictions:   - Applied paraffin to (R) hand with double layer coverage to protect skin integrity to increase muscle elasticity and improve blood flow prior to beginning PROM and therapeutic interventions.  -Ultrasound  3.3 MHz with 0.8 Wcm2 100%dutycycle  x 6 minutes with prep  (R) carpal tunnel scar site to  promote improved muscle circulation, elasticity, relaxation, and reduce spasm as well as post session inflammation.   -Cryotherapy to (R) wrist/hand x 5 min to decreased inflammation, pain, and soreness while completing therapeutic exercise below:  -Black digi-flex    -composite 2 x 15    -isolation 2 x 15     -Black Clothespin    -pincer 2 x 15    -tripod 2 x 15    -lateral 2 x 15     Steph received the following manual therapy techniques for 8 minutes including:   -Pt tolerated retrograde massage on R for hand majority in volar palm for reduction of edema in lymphatic system utilizing retrograde soft tissue massage to optimize ROM and impact fx.   - Scar massage over incision site and surrounding areas    Steph participated in dynamic functional therapeutic exercise for 31minutes with rest breaks to improve functional performance, including:  - Wrist flexion stretch in passive range of motion: 1 x 10 with 10 second end range hold   - Wrist extension stretch in passive range of motion: 1 x 10 with 10 second end range hold   -  Tendon glides: 1 x 10  - Green digiweb 2 x 10 each   - metacarpal phalangeal flexion   - metacarpal phalangeal extension   - pronation   - Supination   - Blue sponge digit opposition: 20 reps   -Active range of motion 2 x 10 with 2#   -wrist flexion over wedge   -wrist extension over wedge   -RD/UD over wedge   - pronation/supination  - Theraball squeeze: 30 reps 2 second hold     Home Exercises and Education Provided     Education provided:   - Roles and goals of occupational therapy   - Occupational therapy plan of care  - Progress towards goals     Written Home Exercises Provided: Patient instructed to cont prior HEP.  Exercises were reviewed and Steph was able to demonstrate them prior to the end of the session.  Steph demonstrated good  understanding of the HEP provided.     Pt instructed to use ice at home few times a day to help with swelling.     Pt also instructed to add median nerve glides x 10 and prolonged wrist flexion and prolonged wrist extension stretch 10 sec holds x 10 each  .   See EMR under Patient Instructions for exercises provided prior visit.        Assessment     Pt would continue to benefit from skilled occupational therapy. She is tolerating sessions very well at this time with no reported increases in pain during session. She able to increase resistance on digi-web this day showing improvement. Pt main complaint today was a bruising pain on ulnar side of hand from MCP of 5th digit to ulnar styloid. Pt reported post session prior to modalities pain was about at a 3/10. Post modalities pain was 0/10. Pt reported she mowed the grass this morning which could be why its sore/bruised feeling. Continued progress in strengthening noted.    Steph is progressing well towards her goals and there are no updates to goals at this time. Pt prognosis is Good.     Pt will continue to benefit from skilled OT intervention. Patient continues to demonstrate limitation  with  ROM, Joint mobility,  Stiffness, Decreased fine motor coordination, Decreased gross motor coordination, Decreased functional use, Impaired sensation, Decreased strength, Continued pain, and Continued inflammation     Goals:  The following goals were discussed with the patient and patient is in agreement with them as to be addressed in the treatment plan.   Long Term Goals (LTGs); to be met by discharge.  LTG #1: Pt will report a pain level of 3 out of 10 with functional use of (R) hand       LTG #2: Pt will demo improved FOTO score by 20 points.   LTG #3: Pt will return to prior level of function for using (R) hand for daily gardening and house hold activities.  LTG #4: Pt will demonstrate increased  strength of 20#s in order to increase fine motor skills and dexterity.     Short Term Goals (STGs); to be met .  STG #1: Pt will report 6 out of 10 pain level with with functional use of (R) hand.  STG #2: Pt will demonstrate increased ROM of WFL degrees in order increase functional use of UE  STG #3: Pt will demonstrate increased  strength of 10#s in order to increase fine motor skills and dexterity.  STG #4: Pt will complete the 9 peg strength test in 20 sec time frame in order to improve fine motor dexterity.  STG #5: Pt will demonstrate independence with issued HEP.   STG #6: Pt will report decreased sensitivity to scar areas with decreased tissue adhesion    Plan   Plan of Care Certification: 5/29/2024 to 7/29/2024.      Outpatient Occupational Therapy 2 times weekly for 8 weeks to include the following interventions: Paraffin, Fluidotherapy, Manual therapy/joint mobilizations, Modalities for pain management, US 3 mhz, Therapeutic exercises/activities., Strengthening, Orthotic Fabrication/Fit/Training, Edema Control, Scar Management, and Joint Protection.  Updates/Grading for next session: N/A      FRANKIE Caicedo/L

## 2024-06-10 ENCOUNTER — CLINICAL SUPPORT (OUTPATIENT)
Dept: REHABILITATION | Facility: HOSPITAL | Age: 54
End: 2024-06-10
Payer: MEDICARE

## 2024-06-10 DIAGNOSIS — M25.641 JOINT STIFFNESS OF HAND, RIGHT: ICD-10-CM

## 2024-06-10 DIAGNOSIS — R29.898 IMPAIRED DEXTERITY: ICD-10-CM

## 2024-06-10 DIAGNOSIS — G89.18 ACUTE POST-OPERATIVE PAIN: Primary | ICD-10-CM

## 2024-06-10 DIAGNOSIS — R29.898 DECREASED GRIP STRENGTH OF RIGHT HAND: ICD-10-CM

## 2024-06-10 PROCEDURE — 97530 THERAPEUTIC ACTIVITIES: CPT | Mod: PN

## 2024-06-10 NOTE — PROGRESS NOTES
"  Occupational Therapy Daily Treatment Note     Date: 6/10/2024  Name: Steph Harkins  MRN: [id number]    Diagnosis:   Encounter Diagnoses   Name Primary?    Acute post-operative pain Yes    Joint stiffness of hand, right     Impaired dexterity     Decreased  strength of right hand        Referring Physician: Morelia Holcomb PA-C    Physician Orders: eval and treat  Medical Diagnosis: (R) carpal tunnel release s/p 4/2/2024  Surgical Procedure and Date: 4/2/2024  Evaluation Date: 5/29/2024  Insurance Authorization Period Expiration: 5/23/2024 - 5/23/2025   Plan of Care Certification Period: 5/29/2024 -  7/29/2024  Progress Note Due: 6/27/2024   Date of Return to MD: n/a  Visit # / Visits authorized: 2 / 10  FOTO: 1/3    Visit # / Visits authorized: 3 / 10  DAVID visit number: 1  Time In: 1:40 PM  Time Out: 2:30 PM  Total Billable Time: 50 minutes    Precautions:  Standard      Subjective     Pt reports: "It actually feels good today. It just feels like its sore right here." Described as on ulnar side of hand going down till just distally to radial styloid. No pillar pain this day.  she was compliant with home exercise program given last session.     Pain: 1/10 pre session   1/10 post session  Location: right wrists      Objective   The following objective measures were taken on evaluation 5/29/24.    Active Range of Motion:  (Measured in degrees)    Right   Elbow Flexion 140   Elbow Extension 0   Elbow Supination 70   Elbow Pronation wfl   Wrist Extension  64   Wrist Flexion 71   Radial Deviation 25   Ulnar Deviation 20      Range of Motion Hand  (Active/Passive)      **Full fisting achieved      Strength (Dynamometer/Measured in pounds on Rung II) and Pinch Strength (Pinch Gauge)    5/29/2024 5/29/2024     Right Left   Composite 8 40   Lateral Pinch 4 10   Tripod Pinch 7 10   Tip Pinch 5          7      Opposition (Fine Motor Skills/Dexterity): normal opposition     ** 9 hole peg test :  Right    23 sec  Left    "   27 sec     Wound/Scar management: scar healing noted well with no infection noted however increased raise scar tissue bulk along scar and laterally.     Sensation: tested with monofilaments to be normal along scar and median, ulna, and radial innervations    Sensation:    5/29/2024     Right   Deep pressure normal   Light touch normal   Temperature normal    2 Point Discrimination normal     Edema: Mild edema noted hypothenar/thenar area        Treatment to include:    Direct contact and supervised modalities after being cleared for contradictions: (with prep)  for 13 minutes to include:  - MHP /c  paraffin x 8 minutes to (R) hand with layer coverage to protect skin integrity to increase muscle elasticity and improve blood flow prior to beginning PROM and therapeutic interventions.  -Ultrasound  3.3 MHz 50% 1.3 cm2  x 5 minutes with prep  (R) carpal tunnel scar site to  promote improved muscle circulation, elasticity, relaxation, and reduce spasm as well as post session inflammation.     Steph received the following manual therapy techniques for  minutes including:   -Pt tolerated retrograde massage on R for hand majority in volar palm for reduction of edema in lymphatic system utilizing retrograde soft tissue massage to optimize ROM   - Scar massage over incision site and surrounding areas    Therapeutic Exercise completed to improve functional performance for 32 minutes to include:   - Wrist flexion/ extension over wedge with 2# dowel   - prayer stretches for wrist extension 1x10  - velcro dowels supination /pronation large dowel 3x10  - tan resistance tube supination/pronation 3x10  - Green digiweb digits 2-5 extension3 x 10 each  - White digit sponge opposition: 20 reps   -Active range of motion 2 x 10 with 2#   -wrist flexion over wedge   -wrist extension over wedge   -RD/UD over wedge   - pronation/supination  - Green digi-flex   -composite 2 x 15   -isolation 2 x 15  -Black pinch clip pinch   -pincer 3 x  10   -tripod 3 x 10        Home Exercises and Education Provided     Education provided:   - Roles and goals of occupational therapy   - Occupational therapy plan of care  - Progress towards goals     Written Home Exercises Provided: Patient instructed to cont prior HEP.  Exercises were reviewed and Steph was able to demonstrate them prior to the end of the session.  Steph demonstrated good  understanding of the HEP provided.   Pt instructed to use ice at home few times a day to help with swelling.   Pt also instructed to add median nerve glides x 10 and prolonged wrist flexion and prolonged wrist extension stretch 10 sec holds x 10 each  .   See EMR under Patient Instructions for exercises provided prior visit.        Assessment     Steph tolerated her session well today with no reports of increased pain. Decreased edema noted with session and prior to therapy today. She has increased joint mobility, good opposition, and increased strength.  She reports her soreness is mainly from handling her large dog this am. Pt reported she mowed the grass this morning which could be why its sore/bruised feeling. Continued progress in strengthening noted.  Steph is progressing well towards her goals and there are no updates to goals at this time. Pt prognosis is Good. Pt will continue to benefit from skilled OT intervention. Patient continues to demonstrate limitation  with  ROM, Joint mobility, Stiffness, Decreased fine motor coordination, Decreased gross motor coordination, Decreased functional use, Impaired sensation, Decreased strength, Continued pain, and Continued inflammation     Goals:  The following goals were discussed with the patient and patient is in agreement with them as to be addressed in the treatment plan.   Long Term Goals (LTGs); to be met by discharge.  LTG #1: Pt will report a pain level of 3 out of 10 with functional use of (R) hand       LTG #2: Pt will demo improved FOTO score by 20 points.    LTG #3: Pt will return to prior level of function for using (R) hand for daily gardening and house hold activities.  LTG #4: Pt will demonstrate increased  strength of 20#s in order to increase fine motor skills and dexterity.     Short Term Goals (STGs); to be met .  STG #1: Pt will report 6 out of 10 pain level with with functional use of (R) hand.  STG #2: Pt will demonstrate increased ROM of WFL degrees in order increase functional use of UE  STG #3: Pt will demonstrate increased  strength of 10#s in order to increase fine motor skills and dexterity.  STG #4: Pt will complete the 9 peg strength test in 20 sec time frame in order to improve fine motor dexterity.  STG #5: Pt will demonstrate independence with issued HEP.   STG #6: Pt will report decreased sensitivity to scar areas with decreased tissue adhesion    Plan   Plan of Care Certification: 5/29/2024 to 7/29/2024.      Outpatient Occupational Therapy 2 times weekly for 8 weeks to include the following interventions: Paraffin, Fluidotherapy, Manual therapy/joint mobilizations, Modalities for pain management, US 3 mhz, Therapeutic exercises/activities., Strengthening, Orthotic Fabrication/Fit/Training, Edema Control, Scar Management, and Joint Protection.  Updates/Grading for next session: N/A      Mojgan Gonzalez OT

## 2024-06-14 ENCOUNTER — CLINICAL SUPPORT (OUTPATIENT)
Dept: REHABILITATION | Facility: HOSPITAL | Age: 54
End: 2024-06-14
Payer: MEDICARE

## 2024-06-14 DIAGNOSIS — G89.18 ACUTE POST-OPERATIVE PAIN: Primary | ICD-10-CM

## 2024-06-14 DIAGNOSIS — M25.641 JOINT STIFFNESS OF HAND, RIGHT: ICD-10-CM

## 2024-06-14 DIAGNOSIS — R29.898 IMPAIRED DEXTERITY: ICD-10-CM

## 2024-06-14 DIAGNOSIS — R29.898 DECREASED GRIP STRENGTH OF RIGHT HAND: ICD-10-CM

## 2024-06-14 PROCEDURE — 97124 MASSAGE THERAPY: CPT | Mod: PN

## 2024-06-14 PROCEDURE — 97110 THERAPEUTIC EXERCISES: CPT | Mod: PN

## 2024-06-14 PROCEDURE — 97018 PARAFFIN BATH THERAPY: CPT | Mod: PN

## 2024-06-14 NOTE — PROGRESS NOTES
"  Occupational Therapy Daily Treatment Note     Date: 6/14/2024  Name: Steph Harkins  MRN: [id number]    Diagnosis:   Encounter Diagnoses   Name Primary?    Acute post-operative pain Yes    Joint stiffness of hand, right     Impaired dexterity     Decreased  strength of right hand        Referring Physician: Morelia Holcomb PA-C    Physician Orders: eval and treat  Medical Diagnosis: (R) carpal tunnel release s/p 4/2/2024  Surgical Procedure and Date: 4/2/2024  Evaluation Date: 5/29/2024  Insurance Authorization Period Expiration: 5/23/2024 - 5/23/2025   Plan of Care Certification Period: 5/29/2024 -  7/29/2024  Progress Note Due: 6/27/2024     Visit # / Visits authorized: 5 / 10  FRANKIE visit number: 0  Time In: 8:00 AM  Time Out: 8:45 AM  Total Billable Time:41 minutes    Precautions:  Standard      Subjective     Pt reports: "No complaints  she was compliant with home exercise program given last session.     Pain: 0/10 pre session   0/10 post session  Location: right wrists      Objective   The following objective measures were taken on evaluation 5/29/24.    Active Range of Motion:  (Measured in degrees)    Right   Elbow Flexion 140   Elbow Extension 0   Elbow Supination 70   Elbow Pronation wfl   Wrist Extension  64   Wrist Flexion 71   Radial Deviation 25   Ulnar Deviation 20      Range of Motion Hand  (Active/Passive)      **Full fisting achieved      Strength (Dynamometer/Measured in pounds on Rung II) and Pinch Strength (Pinch Gauge)    5/29/2024 5/29/2024     Right Left   Composite 8 40   Lateral Pinch 4 10   Tripod Pinch 7 10   Tip Pinch 5          7      Opposition (Fine Motor Skills/Dexterity): normal opposition     ** 9 hole peg test :  Right    23 sec  Left      27 sec     Wound/Scar management: scar healing noted well with no infection noted however increased raise scar tissue bulk along scar and laterally.     Sensation: tested with monofilaments to be normal along scar and median, ulna, and " radial innervations    Sensation:    5/29/2024     Right   Deep pressure normal   Light touch normal   Temperature normal    2 Point Discrimination normal     Edema: Mild edema noted hypothenar/thenar area        Treatment to include:    Direct contact and supervised modalities after being cleared for contradictions: (with prep)  for 10 minutes to include:  - MHP /c  paraffin x 10 minutes to (R) hand with layer coverage to protect skin integrity to increase muscle elasticity and improve blood flow prior to beginning PROM and therapeutic interventions.  -Ultrasound  3.3 MHz 50% 1.3 cm2  x 5 minutes with prep  (R) carpal tunnel scar site to  promote improved muscle circulation, elasticity, relaxation, and reduce spasm as well as post session inflammation. (Deferred)    Steph received the following manual therapy techniques for 8 minutes including:   -Pt tolerated retrograde massage on R for hand majority in volar palm for reduction of edema in lymphatic system utilizing retrograde soft tissue massage to optimize ROM   - Scar suction over incision site and surrounding areas to prevent/break up scar adhesions.    Therapeutic Exercise completed to improve functional performance for 23 minutes to include:   Green resistance bar 2 x 20 each:   - flexion   - extension   - supination   - pronation   - ulnar/radial deviation  - Green putty exercises 2 x 2 minutes including:   - tripod pinch   - dowel press   - knob twist  - Black digiflex composite: 30 reps  - Blue digiflex individual: 20  - Black weighted clip tripod and lateral pinch: 30 reps each     Home Exercises and Education Provided     Education provided:   - Roles and goals of occupational therapy   - Occupational therapy plan of care  - Progress towards goals     Written Home Exercises Provided: Patient instructed to cont prior HEP.  Exercises were reviewed and Steph was able to demonstrate them prior to the end of the session.  Steph demonstrated good   understanding of the HEP provided.   Pt instructed to use ice at home few times a day to help with swelling.   Pt also instructed to add median nerve glides x 10 and prolonged wrist flexion and prolonged wrist extension stretch 10 sec holds x 10 each  .   See EMR under Patient Instructions for exercises provided prior visit.        Assessment     Steph tolerated her session well today. She is reporting reductions in overall pain at this time but does still experience sore pillar pain around scar site at carpal tunnel. She does continue with some pain with weightbearing through her right upper extremity but reports improved functional use of right upper extremity during daily activities.  Steph is progressing well towards her goals and there are no updates to goals at this time. Pt prognosis is Good. Pt will continue to benefit from skilled OT intervention. Patient continues to demonstrate limitation  with  ROM, Joint mobility, Stiffness, Decreased fine motor coordination, Decreased gross motor coordination, Decreased functional use, Impaired sensation, Decreased strength, Continued pain, and Continued inflammation     Goals:  The following goals were discussed with the patient and patient is in agreement with them as to be addressed in the treatment plan.   Long Term Goals (LTGs); to be met by discharge.  LTG #1: Pt will report a pain level of 3 out of 10 with functional use of (R) hand       LTG #2: Pt will demo improved FOTO score by 20 points.   LTG #3: Pt will return to prior level of function for using (R) hand for daily gardening and house hold activities.  LTG #4: Pt will demonstrate increased  strength of 20#s in order to increase fine motor skills and dexterity.     Short Term Goals (STGs); to be met .  STG #1: Pt will report 6 out of 10 pain level with with functional use of (R) hand.  STG #2: Pt will demonstrate increased ROM of WFL degrees in order increase functional use of UE  STG #3: Pt will  demonstrate increased  strength of 10#s in order to increase fine motor skills and dexterity.  STG #4: Pt will complete the 9 peg strength test in 20 sec time frame in order to improve fine motor dexterity.  STG #5: Pt will demonstrate independence with issued HEP.   STG #6: Pt will report decreased sensitivity to scar areas with decreased tissue adhesion    Plan   Plan of Care Certification: 5/29/2024 to 7/29/2024.      Outpatient Occupational Therapy 2 times weekly for 8 weeks to include the following interventions: Paraffin, Fluidotherapy, Manual therapy/joint mobilizations, Modalities for pain management, US 3 mhz, Therapeutic exercises/activities., Strengthening, Orthotic Fabrication/Fit/Training, Edema Control, Scar Management, and Joint Protection.  Updates/Grading for next session: N/A      Nick Cuello OT

## 2024-06-18 ENCOUNTER — CLINICAL SUPPORT (OUTPATIENT)
Dept: SMOKING CESSATION | Facility: CLINIC | Age: 54
End: 2024-06-18

## 2024-06-18 DIAGNOSIS — F17.200 NICOTINE DEPENDENCE: Primary | ICD-10-CM

## 2024-06-18 NOTE — PROGRESS NOTES
"Individual Follow-Up Form    6/18/2024    Quit Date: 6/5/24    Clinical Status of Patient: Outpatient    Length of Service: 30 minutes    Continuing Medication: yes  Wellbutrin    Other Medications: none     Target Symptoms: Withdrawal and medication side effects. The following were  rated moderate (3) to severe (4) on TCRS:  Moderate (3): desire/crave tobacco  Severe (4): none    Comments: Spoke with patient regarding smoking cessation follow up. Patient reports that she quit smoking on June 5, 2024. Congratulated patient for meeting her goal. Pt continues 150 mg Wellbutrin SR BID. No adverse reactions noted at this time. Goal is to wean cessation medication as tolerated. Completion of TCRS (Tobacco Cessation Rating Scale) reviewed strategies, habitual behavior, high risks situations, understanding urges and cravings, stress and relaxation with open discussion and additional interventions, Introduced lapses, relapses, understanding them and analyzing the situation of a lapse, conflict issues that may be linked to a lapse. Discussed the dangers of "some day smoking" and bumming cigarettes from others. Encouraged patient to remain positive. FU in 3 weeks.    Diagnosis: F17.200    Next Visit: 3 weeks    "

## 2024-06-24 ENCOUNTER — CLINICAL SUPPORT (OUTPATIENT)
Dept: REHABILITATION | Facility: HOSPITAL | Age: 54
End: 2024-06-24
Payer: MEDICARE

## 2024-06-24 DIAGNOSIS — R29.898 IMPAIRED DEXTERITY: ICD-10-CM

## 2024-06-24 DIAGNOSIS — M25.641 JOINT STIFFNESS OF HAND, RIGHT: ICD-10-CM

## 2024-06-24 DIAGNOSIS — G89.18 ACUTE POST-OPERATIVE PAIN: Primary | ICD-10-CM

## 2024-06-24 DIAGNOSIS — R29.898 DECREASED GRIP STRENGTH OF RIGHT HAND: ICD-10-CM

## 2024-06-24 PROCEDURE — 97018 PARAFFIN BATH THERAPY: CPT | Mod: PN

## 2024-06-24 PROCEDURE — 97110 THERAPEUTIC EXERCISES: CPT | Mod: PN

## 2024-06-24 NOTE — PROGRESS NOTES
"  Occupational Therapy Daily Treatment Note     Date: 6/24/2024  Name: Steph Harkins  MRN: [id number]    Diagnosis:   Encounter Diagnoses   Name Primary?    Acute post-operative pain Yes    Joint stiffness of hand, right     Impaired dexterity     Decreased  strength of right hand        Referring Physician: Morelia Holcomb PA-C    Physician Orders: eval and treat  Medical Diagnosis: (R) carpal tunnel release s/p 4/2/2024  Surgical Procedure and Date: 4/2/2024  Evaluation Date: 5/29/2024  Insurance Authorization Period Expiration: 5/23/2024 - 5/23/2025   Plan of Care Certification Period: 5/29/2024 -  7/29/2024  Progress Note Due: 6/27/2024     Visit # / Visits authorized: 5 / 10  FRANKIE visit number: 0  Time In: 8:00 AM  Time Out: 8:45 AM  Total Billable Time:41 minutes    Precautions:  Standard      Subjective     Pt reports: "No complaints  she was compliant with home exercise program given last session.     Pain: 0/10 pre session   0/10 post session  Location: right wrists      Objective   The following objective measures were taken on evaluation 5/29/24.    Active Range of Motion:  (Measured in degrees)    Right   Elbow Flexion 140   Elbow Extension 0   Elbow Supination 70   Elbow Pronation wfl   Wrist Extension  64   Wrist Flexion 71   Radial Deviation 25   Ulnar Deviation 20      Range of Motion Hand  (Active/Passive)      **Full fisting achieved      Strength (Dynamometer/Measured in pounds on Rung II) and Pinch Strength (Pinch Gauge)    5/29/2024 5/29/2024     Right Left   Composite 8 40   Lateral Pinch 4 10   Tripod Pinch 7 10   Tip Pinch 5          7      Opposition (Fine Motor Skills/Dexterity): normal opposition     ** 9 hole peg test :  Right    23 sec  Left      27 sec     Wound/Scar management: scar healing noted well with no infection noted however increased raise scar tissue bulk along scar and laterally.     Sensation: tested with monofilaments to be normal along scar and median, ulna, and " radial innervations    Sensation:    5/29/2024     Right   Deep pressure normal   Light touch normal   Temperature normal    2 Point Discrimination normal     Edema: Mild edema noted hypothenar/thenar area        Treatment to include:    Direct contact and supervised modalities after being cleared for contradictions: (with prep)  for 10 minutes to include:  - MHP /c  paraffin x 10 minutes to (R) hand with layer coverage to protect skin integrity to increase muscle elasticity and improve blood flow prior to beginning PROM and therapeutic interventions.    Therapeutic Exercise completed to improve functional performance for 28 minutes with rest breaks to include:   Blue resistance bar 2 x 20 each:   - flexion   - extension   - supination   - pronation   - ulnar/radial deviation  - Black digiflex composite: 30 reps  - Black digiflex individual: 20  - Black weighted clip tripod and lateral pinch: 30 reps each  - Green sponge opposition: 30 reps  - Blue digiweb exercises for 2 x 10 including:   - metacarpal phalangeal flexion   - metacarpal phalangeal extension   - Supination   - Pronation    - Wrist resisted active range of motion:    - flexion: 2 x 10 4#   - extension: 2 x 10 4#   - Supiantion: 2 x 10 2# pronator bar   - Pronation: 2 x 10 2# pronator bar   - ulnar/radial deviation: 2 x 10 2#  - Grooved peg board: x24 pegs   Home Exercises and Education Provided     Education provided:   - Roles and goals of occupational therapy   - Occupational therapy plan of care  - Progress towards goals     Written Home Exercises Provided: Patient instructed to cont prior HEP.  Exercises were reviewed and Steph was able to demonstrate them prior to the end of the session.  Steph demonstrated good  understanding of the HEP provided.   Pt instructed to use ice at home few times a day to help with swelling.   Pt also instructed to add median nerve glides x 10 and prolonged wrist flexion and prolonged wrist extension stretch 10 sec  holds x 10 each  .   See EMR under Patient Instructions for exercises provided prior visit.        Assessment     Steph tolerated her session well today. Pt currently reporting minimal to no pain to right hand today. She continues to report some continued pillar pain to hand with weight bearing and functional use at this time. No weakness reported or demonstrated. Steph is progressing well towards her goals and there are no updates to goals at this time. Pt prognosis is Good. Pt will continue to benefit from skilled OT intervention. Patient continues to demonstrate limitation  with  ROM, Joint mobility, Stiffness, Decreased fine motor coordination, Decreased gross motor coordination, Decreased functional use, Impaired sensation, Decreased strength, Continued pain, and Continued inflammation     Goals:  The following goals were discussed with the patient and patient is in agreement with them as to be addressed in the treatment plan.   Long Term Goals (LTGs); to be met by discharge.  LTG #1: Pt will report a pain level of 3 out of 10 with functional use of (R) hand       LTG #2: Pt will demo improved FOTO score by 20 points.   LTG #3: Pt will return to prior level of function for using (R) hand for daily gardening and house hold activities.  LTG #4: Pt will demonstrate increased  strength of 20#s in order to increase fine motor skills and dexterity.     Short Term Goals (STGs); to be met .  STG #1: Pt will report 6 out of 10 pain level with with functional use of (R) hand.  STG #2: Pt will demonstrate increased ROM of WFL degrees in order increase functional use of UE  STG #3: Pt will demonstrate increased  strength of 10#s in order to increase fine motor skills and dexterity.  STG #4: Pt will complete the 9 peg strength test in 20 sec time frame in order to improve fine motor dexterity.  STG #5: Pt will demonstrate independence with issued HEP.   STG #6: Pt will report decreased sensitivity to scar areas  with decreased tissue adhesion    Plan   Plan of Care Certification: 5/29/2024 to 7/29/2024.      Outpatient Occupational Therapy 2 times weekly for 8 weeks to include the following interventions: Paraffin, Fluidotherapy, Manual therapy/joint mobilizations, Modalities for pain management, US 3 mhz, Therapeutic exercises/activities., Strengthening, Orthotic Fabrication/Fit/Training, Edema Control, Scar Management, and Joint Protection.  Updates/Grading for next session: N/A      Nick Cuello OT

## 2024-06-26 ENCOUNTER — CLINICAL SUPPORT (OUTPATIENT)
Dept: REHABILITATION | Facility: HOSPITAL | Age: 54
End: 2024-06-26
Payer: MEDICARE

## 2024-06-26 DIAGNOSIS — R29.898 IMPAIRED DEXTERITY: ICD-10-CM

## 2024-06-26 DIAGNOSIS — M25.641 JOINT STIFFNESS OF HAND, RIGHT: ICD-10-CM

## 2024-06-26 DIAGNOSIS — G89.18 ACUTE POST-OPERATIVE PAIN: Primary | ICD-10-CM

## 2024-06-26 DIAGNOSIS — R29.898 DECREASED GRIP STRENGTH OF RIGHT HAND: ICD-10-CM

## 2024-06-26 PROCEDURE — 97530 THERAPEUTIC ACTIVITIES: CPT | Mod: PN

## 2024-06-26 NOTE — PROGRESS NOTES
"  Occupational Therapy Daily Treatment Note/Discharge Summary     Date: 6/26/2024  Name: Steph Harkins  MRN: [id number]    Diagnosis:   Encounter Diagnoses   Name Primary?    Acute post-operative pain Yes    Joint stiffness of hand, right     Impaired dexterity     Decreased  strength of right hand        Referring Physician: Morelia Holcomb PA-C    Physician Orders: eval and treat  Medical Diagnosis: (R) carpal tunnel release s/p 4/2/2024  Surgical Procedure and Date: 4/2/2024  Evaluation Date: 5/29/2024  Insurance Authorization Period Expiration: 5/23/2024 - 5/23/2025   Plan of Care Certification Period: 5/29/2024 -  7/29/2024  Progress Note Due: 6/27/2024     Visit # / Visits authorized: 7 / 10  DAVID visit number: 0  Time In: 9:45 AM  Time Out: 10:00 AM  Total Billable Time 15 minutes    Precautions:  Standard      Subjective     Pt reports: "No complaints  she was compliant with home exercise program given last session.     Pain: 0/10 pre session   0/10 post session  Location: right wrists      Objective   The following objective measures were taken on evaluation 5/29/24.    Active Range of Motion:  (Measured in degrees)    Right Right    Elbow Flexion 140 145   Elbow Extension 0 180   Elbow Supination 70 85   Elbow Pronation wfl 90   Wrist Extension  64 65   Wrist Flexion 71 80   Radial Deviation 25 35   Ulnar Deviation 20 45      Range of Motion Hand  (Active/Passive)      **Full fisting achieved      Strength (Dynamometer/Measured in pounds on Rung II) and Pinch Strength (Pinch Gauge)    5/29/2024 6/26/2024     Right Right    Composite 8 40   Lateral Pinch 4 8   Tripod Pinch 7 11   Tip Pinch 5 11      Opposition (Fine Motor Skills/Dexterity): normal opposition     ** 9 hole peg test :  Right    23 sec  Left      27 sec     Wound/Scar management: scar healing noted well with no infection noted however increased raise scar tissue bulk along scar and laterally.     Sensation: tested with monofilaments to " be normal along scar and median, ulna, and radial innervations    Sensation:    5/29/2024     Right   Deep pressure normal   Light touch normal   Temperature normal    2 Point Discrimination normal     Edema: Mild edema noted hypothenar/thenar area        Home Exercises and Education Provided     Education provided:   - Roles and goals of occupational therapy   - Occupational therapy plan of care  - Progress towards goals     Written Home Exercises Provided: Patient instructed to cont prior HEP.  Exercises were reviewed and Steph was able to demonstrate them prior to the end of the session.  Steph demonstrated good  understanding of the HEP provided.   Pt instructed to use ice at home few times a day to help with swelling.   Pt also instructed to add median nerve glides x 10 and prolonged wrist flexion and prolonged wrist extension stretch 10 sec holds x 10 each  .   See EMR under Patient Instructions for exercises provided prior visit.        Assessment     Pt is currently meeting 10/10 goals at this time and is reporting no current difficulty with daily tasks or right upper extremity use. She continues to report mild pillar pain which is slowly resolving. She has no complaints at this time. Pt be discharged from OT services due to satisfactory goal attainment.     Goals:  The following goals were discussed with the patient and patient is in agreement with them as to be addressed in the treatment plan.   Long Term Goals (LTGs); to be met by discharge.  LTG #1: Pt will report a pain level of 3 out of 10 with functional use of (R) hand  (MET)  LTG #2: Pt will demo improved FOTO score by 20 points. (MET)  LTG #3: Pt will return to prior level of function for using (R) hand for daily gardening and house hold activities. (MET)  LTG #4: Pt will demonstrate increased  strength of 20#s in order to increase fine motor skills and dexterity. (MET)     Short Term Goals (STGs); to be met .  STG #1: Pt will report 6 out  of 10 pain level with with functional use of (R) hand. (MET)  STG #2: Pt will demonstrate increased ROM of WFL degrees in order increase functional use of upper extremity (MET)  STG #3: Pt will demonstrate increased  strength of 10#s in order to increase fine motor skills and dexterity. (MET)  STG #4: Pt will complete the 9 peg strength test in 20 sec time frame in order to improve fine motor dexterity. (MET)  STG #5: Pt will demonstrate independence with issued HEP. (MET)  STG #6: Pt will report decreased sensitivity to scar areas with decreased tissue adhesion (MET)    Plan   Pt to be discharged from OT services at this time.       Nick Cuello OT

## 2024-07-09 ENCOUNTER — TELEPHONE (OUTPATIENT)
Dept: SMOKING CESSATION | Facility: CLINIC | Age: 54
End: 2024-07-09
Payer: MEDICARE

## 2024-07-24 ENCOUNTER — TELEPHONE (OUTPATIENT)
Dept: SMOKING CESSATION | Facility: CLINIC | Age: 54
End: 2024-07-24
Payer: MEDICARE

## 2024-09-02 PROBLEM — G89.18 ACUTE POST-OPERATIVE PAIN: Status: RESOLVED | Noted: 2024-05-29 | Resolved: 2024-09-02

## 2025-01-06 ENCOUNTER — PATIENT MESSAGE (OUTPATIENT)
Dept: FAMILY MEDICINE | Facility: CLINIC | Age: 55
End: 2025-01-06
Payer: MEDICARE

## 2025-02-14 ENCOUNTER — HOSPITAL ENCOUNTER (OUTPATIENT)
Dept: RADIOLOGY | Facility: HOSPITAL | Age: 55
Discharge: HOME | End: 2025-02-14
Attending: PAIN MEDICINE
Payer: MEDICARE

## 2025-02-14 DIAGNOSIS — M25.559 HIP PAIN: ICD-10-CM

## 2025-02-14 PROCEDURE — 73521 X-RAY EXAM HIPS BI 2 VIEWS: CPT | Mod: TC

## 2025-02-20 ENCOUNTER — OFFICE VISIT (OUTPATIENT)
Dept: FAMILY MEDICINE | Facility: CLINIC | Age: 55
End: 2025-02-20
Payer: MEDICARE

## 2025-02-20 VITALS
RESPIRATION RATE: 17 BRPM | OXYGEN SATURATION: 93 % | HEART RATE: 76 BPM | DIASTOLIC BLOOD PRESSURE: 72 MMHG | HEIGHT: 64 IN | WEIGHT: 139 LBS | BODY MASS INDEX: 23.73 KG/M2 | SYSTOLIC BLOOD PRESSURE: 112 MMHG

## 2025-02-20 DIAGNOSIS — E78.5 DYSLIPIDEMIA: Primary | ICD-10-CM

## 2025-02-20 DIAGNOSIS — E55.9 VITAMIN D DEFICIENCY DISEASE: ICD-10-CM

## 2025-02-20 DIAGNOSIS — K63.5 POLYP OF COLON, UNSPECIFIED PART OF COLON, UNSPECIFIED TYPE: ICD-10-CM

## 2025-02-20 DIAGNOSIS — F33.2 SEVERE RECURRENT MAJOR DEPRESSION WITHOUT PSYCHOTIC FEATURES: ICD-10-CM

## 2025-02-20 DIAGNOSIS — F17.200 SMOKER: ICD-10-CM

## 2025-02-20 DIAGNOSIS — Z12.11 COLON CANCER SCREENING: ICD-10-CM

## 2025-02-20 DIAGNOSIS — J43.9 PULMONARY EMPHYSEMA, UNSPECIFIED EMPHYSEMA TYPE: ICD-10-CM

## 2025-02-20 DIAGNOSIS — M81.0 OSTEOPOROSIS, POSTMENOPAUSAL: ICD-10-CM

## 2025-02-20 DIAGNOSIS — F41.1 GAD (GENERALIZED ANXIETY DISORDER): ICD-10-CM

## 2025-02-20 NOTE — PROGRESS NOTES
Subjective:       Patient ID: Steph Harkins is a 54 y.o. female.    Chief Complaint: Establish Care (Pt here to re-est care. )    Pt is a 54 y.o. female who presents for evaluation and management of   Encounter Diagnoses   Name Primary?    Dyslipidemia Yes    Vitamin D deficiency disease     Smoker     Osteoporosis, postmenopausal     RENITA (generalized anxiety disorder)     Severe recurrent major depression without psychotic features     Pulmonary emphysema, unspecified emphysema type     Colon cancer screening     Polyp of colon, unspecified part of colon, unspecified type      Doing well on current meds. Denies any side effects. Prevention is up to date.  Review of Systems   Constitutional:  Negative for chills and fever.   Respiratory:  Negative for shortness of breath.    Cardiovascular:  Negative for chest pain and palpitations.   Gastrointestinal:  Negative for abdominal pain, blood in stool, constipation and nausea.   Genitourinary:  Negative for difficulty urinating.   Psychiatric/Behavioral:  Negative for dysphoric mood, sleep disturbance and suicidal ideas. The patient is not nervous/anxious.        Objective:      Physical Exam  Constitutional:       Appearance: She is well-developed.   HENT:      Head: Normocephalic and atraumatic.      Right Ear: External ear normal.      Left Ear: External ear normal.      Nose: Nose normal.   Eyes:      Pupils: Pupils are equal, round, and reactive to light.   Neck:      Thyroid: No thyromegaly.      Vascular: No JVD.      Trachea: No tracheal deviation.   Cardiovascular:      Rate and Rhythm: Normal rate.      Heart sounds: Normal heart sounds. No murmur heard.  Pulmonary:      Effort: Pulmonary effort is normal. No respiratory distress.      Breath sounds: Wheezing present. No rales.      Comments: End exp wheeze right lower lobe     Chest:      Chest wall: No tenderness.   Abdominal:      General: Bowel sounds are normal. There is no distension.      Palpations:  Abdomen is soft. There is no mass.      Tenderness: There is no abdominal tenderness. There is no guarding or rebound.   Musculoskeletal:         General: No tenderness. Normal range of motion.      Cervical back: Normal range of motion and neck supple.   Lymphadenopathy:      Cervical: No cervical adenopathy.   Skin:     General: Skin is warm and dry.      Coloration: Skin is not pale.      Findings: No erythema or rash.   Neurological:      Mental Status: She is alert and oriented to person, place, and time.      Cranial Nerves: No cranial nerve deficit.      Motor: No abnormal muscle tone.      Coordination: Coordination normal.      Deep Tendon Reflexes: Reflexes are normal and symmetric. Reflexes normal.   Psychiatric:         Behavior: Behavior normal.         Thought Content: Thought content normal.         Judgment: Judgment normal.         Assessment:       1. Dyslipidemia    2. Vitamin D deficiency disease    3. Smoker    4. Osteoporosis, postmenopausal    5. RENITA (generalized anxiety disorder)    6. Severe recurrent major depression without psychotic features    7. Pulmonary emphysema, unspecified emphysema type    8. Colon cancer screening    9. Polyp of colon, unspecified part of colon, unspecified type        Plan:   1. Dyslipidemia  Overview:  Lab Results   Component Value Date    LDLCALC 102.0 03/07/2024     Lovastatin     Orders:  -     Comprehensive Metabolic Panel; Future; Expected date: 02/20/2025  -     Lipid Panel; Future; Expected date: 02/20/2025  -     TSH; Future; Expected date: 02/20/2025    2. Vitamin D deficiency disease  -     Vitamin D; Future; Expected date: 02/20/2025    3. Smoker  Overview:  40 pk yr hx   Failed cessation attempts, multiple         Orders:  -     CBC Auto Differential; Future; Expected date: 02/20/2025  -     Ambulatory referral/consult to Smoking Cessation Program; Future; Expected date: 02/27/2025    4. Osteoporosis, postmenopausal  Overview:  Continue fosamax    DXA up to date ---done 3/2024       5. RENITA (generalized anxiety disorder)  Overview:  Sees psychiatrist at [facility] ---lexapro increased to 20  Trazodone prn for sleep         6. Severe recurrent major depression without psychotic features  Overview:  Taking lexapro 20mg with her psychiatrist   Stable         7. Pulmonary emphysema, unspecified emphysema type  Overview:  Tung   Uses albuterol about 4x/ week   Sees Dr. Davies           8. Colon cancer screening  -     Case Request Endoscopy: COLONOSCOPY    9. Polyp of colon, unspecified part of colon, unspecified type  -     Case Request Endoscopy: COLONOSCOPY      No follow-ups on file.

## 2025-02-21 ENCOUNTER — CLINICAL SUPPORT (OUTPATIENT)
Dept: FAMILY MEDICINE | Facility: CLINIC | Age: 55
End: 2025-02-21
Payer: MEDICARE

## 2025-02-21 DIAGNOSIS — F17.200 SMOKER: ICD-10-CM

## 2025-02-21 DIAGNOSIS — E55.9 VITAMIN D DEFICIENCY DISEASE: ICD-10-CM

## 2025-02-21 DIAGNOSIS — E78.5 DYSLIPIDEMIA: ICD-10-CM

## 2025-02-21 LAB
ALBUMIN SERPL BCP-MCNC: 4.2 G/DL (ref 3.5–5.2)
ALP SERPL-CCNC: 70 U/L (ref 40–150)
ALT SERPL W/O P-5'-P-CCNC: 20 U/L (ref 10–44)
ANION GAP SERPL CALC-SCNC: 9 MMOL/L (ref 8–16)
AST SERPL-CCNC: 18 U/L (ref 10–40)
BASOPHILS # BLD AUTO: 0.03 K/UL (ref 0–0.2)
BASOPHILS NFR BLD: 0.4 % (ref 0–1.9)
BILIRUB SERPL-MCNC: 0.4 MG/DL (ref 0.1–1)
BUN SERPL-MCNC: 21 MG/DL (ref 6–20)
CALCIUM SERPL-MCNC: 9.3 MG/DL (ref 8.7–10.5)
CHLORIDE SERPL-SCNC: 106 MMOL/L (ref 95–110)
CHOLEST SERPL-MCNC: 197 MG/DL (ref 120–199)
CHOLEST/HDLC SERPL: 4.5 {RATIO} (ref 2–5)
CO2 SERPL-SCNC: 30 MMOL/L (ref 23–29)
CREAT SERPL-MCNC: 0.7 MG/DL (ref 0.5–1.4)
DIFFERENTIAL METHOD BLD: ABNORMAL
EOSINOPHIL # BLD AUTO: 0.1 K/UL (ref 0–0.5)
EOSINOPHIL NFR BLD: 1.9 % (ref 0–8)
ERYTHROCYTE [DISTWIDTH] IN BLOOD BY AUTOMATED COUNT: 12.9 % (ref 11.5–14.5)
EST. GFR  (NO RACE VARIABLE): >60 ML/MIN/1.73 M^2
GLUCOSE SERPL-MCNC: 91 MG/DL (ref 70–110)
HCT VFR BLD AUTO: 43 % (ref 37–48.5)
HDLC SERPL-MCNC: 44 MG/DL (ref 40–75)
HDLC SERPL: 22.3 % (ref 20–50)
HGB BLD-MCNC: 14 G/DL (ref 12–16)
IMM GRANULOCYTES # BLD AUTO: 0.04 K/UL (ref 0–0.04)
IMM GRANULOCYTES NFR BLD AUTO: 0.5 % (ref 0–0.5)
LDLC SERPL CALC-MCNC: 131.4 MG/DL (ref 63–159)
LYMPHOCYTES # BLD AUTO: 2.6 K/UL (ref 1–4.8)
LYMPHOCYTES NFR BLD: 34.8 % (ref 18–48)
MCH RBC QN AUTO: 31.5 PG (ref 27–31)
MCHC RBC AUTO-ENTMCNC: 32.6 G/DL (ref 32–36)
MCV RBC AUTO: 97 FL (ref 82–98)
MONOCYTES # BLD AUTO: 0.7 K/UL (ref 0.3–1)
MONOCYTES NFR BLD: 9 % (ref 4–15)
NEUTROPHILS # BLD AUTO: 4 K/UL (ref 1.8–7.7)
NEUTROPHILS NFR BLD: 53.4 % (ref 38–73)
NONHDLC SERPL-MCNC: 153 MG/DL
NRBC BLD-RTO: 0 /100 WBC
PLATELET # BLD AUTO: 318 K/UL (ref 150–450)
PMV BLD AUTO: 10.4 FL (ref 9.2–12.9)
POTASSIUM SERPL-SCNC: 4.7 MMOL/L (ref 3.5–5.1)
PROT SERPL-MCNC: 7.6 G/DL (ref 6–8.4)
RBC # BLD AUTO: 4.45 M/UL (ref 4–5.4)
SODIUM SERPL-SCNC: 145 MMOL/L (ref 136–145)
TRIGL SERPL-MCNC: 108 MG/DL (ref 30–150)
TSH SERPL DL<=0.005 MIU/L-ACNC: 1.43 UIU/ML (ref 0.4–4)
WBC # BLD AUTO: 7.52 K/UL (ref 3.9–12.7)

## 2025-02-21 PROCEDURE — 80061 LIPID PANEL: CPT | Performed by: FAMILY MEDICINE

## 2025-02-21 PROCEDURE — 85025 COMPLETE CBC W/AUTO DIFF WBC: CPT | Performed by: FAMILY MEDICINE

## 2025-02-21 PROCEDURE — 84443 ASSAY THYROID STIM HORMONE: CPT | Performed by: FAMILY MEDICINE

## 2025-02-21 PROCEDURE — 82306 VITAMIN D 25 HYDROXY: CPT | Performed by: FAMILY MEDICINE

## 2025-02-21 PROCEDURE — 80053 COMPREHEN METABOLIC PANEL: CPT | Performed by: FAMILY MEDICINE

## 2025-02-22 LAB — 25(OH)D3+25(OH)D2 SERPL-MCNC: 76 NG/ML (ref 30–96)

## 2025-02-23 ENCOUNTER — RESULTS FOLLOW-UP (OUTPATIENT)
Dept: FAMILY MEDICINE | Facility: CLINIC | Age: 55
End: 2025-02-23

## 2025-02-24 NOTE — PROGRESS NOTES
Is she taking the lovastatin? Her cholesterol is not as good as it usually is...  Otherwise her Labs look good. No med changes. Continue same

## 2025-02-27 DIAGNOSIS — M85.851 OSTEOPENIA OF NECKS OF BOTH FEMURS: ICD-10-CM

## 2025-02-27 DIAGNOSIS — M85.852 OSTEOPENIA OF NECKS OF BOTH FEMURS: ICD-10-CM

## 2025-02-27 NOTE — TELEPHONE ENCOUNTER
Pt requesting medication refill.   LOV:  2/20/25                     Pharm: [facility] pended. Thanks  Requested Prescriptions     Pending Prescriptions Disp Refills    alendronate (FOSAMAX) 70 MG tablet 4 tablet 11     Sig: Take 1 tablet (70 mg total) by mouth every 7 days.

## 2025-02-27 NOTE — TELEPHONE ENCOUNTER
No care due was identified.  [facility] Embedded Care Due Messages. Reference number: [id number].   2/27/2025 11:04:50 AM CST

## 2025-02-27 NOTE — TELEPHONE ENCOUNTER
----- Message from Raven sent at 2/27/2025 10:35 AM CST -----  Contact: patient  [id number] 1970[place]: [place]      Not on file.Work Phone      Not on file.Mobile          [phone]: Pt requesting refill or new Rx. Is this a refill or new RX:  new rx RX name and strength: alendronate (FOSAMAX) 70 MG tablet Last office visit: 02/20/2025Is this a 30-day or 90-day RX:  4 tabletsPharmacy name and location:  [facility] Comments:  patient no longer has any refills Phone:  [phone]

## 2025-02-28 RX ORDER — ALENDRONATE SODIUM 70 MG/1
70 TABLET ORAL
Qty: 4 TABLET | Refills: 11 | Status: SHIPPED | OUTPATIENT
Start: 2025-02-28 | End: 2026-02-28

## 2025-03-07 ENCOUNTER — TELEPHONE (OUTPATIENT)
Dept: FAMILY MEDICINE | Facility: CLINIC | Age: 55
End: 2025-03-07
Payer: MEDICARE

## 2025-03-07 DIAGNOSIS — Z12.31 BREAST CANCER SCREENING BY MAMMOGRAM: Primary | ICD-10-CM

## 2025-03-10 ENCOUNTER — OFFICE VISIT (OUTPATIENT)
Dept: OBSTETRICS AND GYNECOLOGY | Facility: CLINIC | Age: 55
End: 2025-03-10
Payer: MEDICARE

## 2025-03-10 VITALS
SYSTOLIC BLOOD PRESSURE: 124 MMHG | HEART RATE: 72 BPM | WEIGHT: 136.38 LBS | DIASTOLIC BLOOD PRESSURE: 82 MMHG | BODY MASS INDEX: 23.28 KG/M2 | HEIGHT: 64 IN

## 2025-03-10 DIAGNOSIS — N95.1 VASOMOTOR SYMPTOMS DUE TO MENOPAUSE: Primary | ICD-10-CM

## 2025-03-10 PROCEDURE — 1160F RVW MEDS BY RX/DR IN RCRD: CPT | Mod: CPTII,S$GLB,, | Performed by: STUDENT IN AN ORGANIZED HEALTH CARE EDUCATION/TRAINING PROGRAM

## 2025-03-10 PROCEDURE — 1159F MED LIST DOCD IN RCRD: CPT | Mod: CPTII,S$GLB,, | Performed by: STUDENT IN AN ORGANIZED HEALTH CARE EDUCATION/TRAINING PROGRAM

## 2025-03-10 PROCEDURE — 3008F BODY MASS INDEX DOCD: CPT | Mod: CPTII,S$GLB,, | Performed by: STUDENT IN AN ORGANIZED HEALTH CARE EDUCATION/TRAINING PROGRAM

## 2025-03-10 PROCEDURE — 3074F SYST BP LT 130 MM HG: CPT | Mod: CPTII,S$GLB,, | Performed by: STUDENT IN AN ORGANIZED HEALTH CARE EDUCATION/TRAINING PROGRAM

## 2025-03-10 PROCEDURE — 99999 PR PBB SHADOW E&M-EST. PATIENT-LVL III: CPT | Mod: PBBFAC,,, | Performed by: STUDENT IN AN ORGANIZED HEALTH CARE EDUCATION/TRAINING PROGRAM

## 2025-03-10 PROCEDURE — 3079F DIAST BP 80-89 MM HG: CPT | Mod: CPTII,S$GLB,, | Performed by: STUDENT IN AN ORGANIZED HEALTH CARE EDUCATION/TRAINING PROGRAM

## 2025-03-10 PROCEDURE — 99203 OFFICE O/P NEW LOW 30 MIN: CPT | Mod: S$GLB,,, | Performed by: STUDENT IN AN ORGANIZED HEALTH CARE EDUCATION/TRAINING PROGRAM

## 2025-03-10 NOTE — PROGRESS NOTES
Subjective:       Patient ID: Steph Harkins is a 54 y.o. female.    Chief Complaint:  Menopause (Patient desires to discuss menopausal concerns - bloating, vaginal dryness, dyspareunia, hot flashes, no libido)      History of Present Illness  Patient is a 53 yo  presenting with menopausal symptoms. She is reporting increased bloating, vaginal dryness, joint pain, hot flashes and low libido. She reports a hysterectomy in her early 30s.           GYN & OB History  No LMP recorded. Patient has had a hysterectomy.   Date of Last Pap: No result found    OB History    Para Term  AB Living   4 2 2  2 2   SAB IAB Ectopic Multiple Live Births   2    2      # Outcome Date GA Lbr Cecil/2nd Weight Sex Type Anes PTL Lv   4 Term      CS-Unspec   ELLEN   3 Term      CS-Unspec   ELLEN   2 SAB            1 SAB                Review of Systems  Review of Systems   Constitutional:  Positive for diaphoresis, fatigue and unexpected weight change. Negative for chills and fever.   HENT:  Negative for congestion, sinus pain and sore throat.    Eyes:  Negative for visual disturbance.   Respiratory:  Negative for cough, chest tightness and shortness of breath.    Cardiovascular:  Negative for chest pain and palpitations.   Gastrointestinal:  Negative for abdominal pain, constipation, diarrhea, nausea and vomiting.   Genitourinary:  Positive for vaginal bleeding and vaginal pain. Negative for menstrual problem and vaginal discharge.   Musculoskeletal:  Positive for back pain and neck pain. Negative for myalgias.   Skin:  Negative for color change, pallor and rash.   Neurological:  Positive for headaches. Negative for dizziness and light-headedness.   Psychiatric/Behavioral:  Positive for sleep disturbance. The patient is not nervous/anxious.            Objective:    Physical Exam:   Constitutional: She is oriented to person, place, and time. She appears well-developed and well-nourished. No distress.       Cardiovascular:   Normal rate.             Pulmonary/Chest: Effort normal. No respiratory distress.                      Neurological: She is alert and oriented to person, place, and time.     Psychiatric: She has a normal mood and affect. Her behavior is normal. Judgment and thought content normal.          Assessment:        1. Vasomotor symptoms due to menopause               Plan:      Steph was seen today for menopause.    Diagnoses and all orders for this visit:    Vasomotor symptoms due to menopause      - discussed supplements for relief of symptoms; discussed cessation of smoking; no hormones while smoking

## 2025-03-21 ENCOUNTER — TELEPHONE (OUTPATIENT)
Dept: SMOKING CESSATION | Facility: CLINIC | Age: 55
End: 2025-03-21
Payer: MEDICARE

## 2025-03-24 ENCOUNTER — HOSPITAL ENCOUNTER (OUTPATIENT)
Dept: RADIOLOGY | Facility: HOSPITAL | Age: 55
Discharge: HOME | End: 2025-03-24
Attending: FAMILY MEDICINE
Payer: MEDICARE

## 2025-03-24 VITALS — HEIGHT: 64 IN | BODY MASS INDEX: 23.22 KG/M2 | WEIGHT: 136 LBS

## 2025-03-24 DIAGNOSIS — Z12.31 BREAST CANCER SCREENING BY MAMMOGRAM: ICD-10-CM

## 2025-03-24 PROCEDURE — 77063 BREAST TOMOSYNTHESIS BI: CPT | Mod: 26,,, | Performed by: RADIOLOGY

## 2025-03-24 PROCEDURE — 77067 SCR MAMMO BI INCL CAD: CPT | Mod: TC

## 2025-03-24 PROCEDURE — 77067 SCR MAMMO BI INCL CAD: CPT | Mod: 26,,, | Performed by: RADIOLOGY

## 2025-03-25 ENCOUNTER — RESULTS FOLLOW-UP (OUTPATIENT)
Dept: FAMILY MEDICINE | Facility: CLINIC | Age: 55
End: 2025-03-25

## 2025-06-26 ENCOUNTER — HOSPITAL ENCOUNTER (OUTPATIENT)
Dept: RADIOLOGY | Facility: HOSPITAL | Age: 55
Discharge: HOME | End: 2025-06-26
Attending: PAIN MEDICINE
Payer: MEDICARE

## 2025-06-26 DIAGNOSIS — M47.814 THORACIC SPONDYLOSIS: ICD-10-CM

## 2025-06-26 PROCEDURE — 72146 MRI CHEST SPINE W/O DYE: CPT | Mod: TC

## 2025-06-26 PROCEDURE — 72146 MRI CHEST SPINE W/O DYE: CPT | Mod: 26,,, | Performed by: RADIOLOGY

## 2025-07-03 DIAGNOSIS — G43.909 MIGRAINES: Primary | ICD-10-CM
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
## 2025-07-10 ENCOUNTER — PATIENT OUTREACH (OUTPATIENT)
Dept: ADMINISTRATIVE | Facility: HOSPITAL | Age: 55
End: 2025-07-10
Payer: MEDICARE

## 2025-07-10 DIAGNOSIS — Z87.891 PERSONAL HISTORY OF NICOTINE DEPENDENCE: ICD-10-CM

## 2025-07-10 DIAGNOSIS — Z71.89 COMPLEX CARE COORDINATION: ICD-10-CM

## 2025-07-10 DIAGNOSIS — Z12.2 SCREENING FOR MALIGNANT NEOPLASM OF RESPIRATORY ORGAN: Primary | ICD-10-CM

## 2025-07-10 DIAGNOSIS — F17.200 TOBACCO USE DISORDER: ICD-10-CM

## 2025-07-10 NOTE — PROGRESS NOTES
"Care Coordination Encounter Details: [facility] Q3 MCIP LDCT Gap Report        MyChart Portal Status:         [x]  Reviewed MyChart Portal Status offered / enrolled if applicable        Additional Notes:     MyChart Outcomes: Pt declined         Updates Requested / Reviewed:        Updated Care Coordination Note, Care Everywhere, , External Sources: [facility] and [facility], Care Team Updated, Removed  or Duplicate Orders, and Immunizations Reconciliation Completed or Queried: [place]         Health Maintenance Screening(s) Due:      Health Maintenance Topics Overdue:  VBHM Score: 1     Colon Cancer Screening               Health Maintenance Topic(s) Outreach Outcomes & Actions Taken:    Colorectal Cancer Screening - Outreach Outcomes & Actions Taken  : Colonoscopy order placed 10/2/2024, Colonoscopy scheduled for 10/2/2025 per Dr. Ankush Kimball and pre-admit appointment scheduled for 2025 at 1:00 pm. Patient notified of appointments details and times.     Osteoporosis Screening - Outreach Outcomes & Actions Taken  : DEXA up to date, next due 3/13/2026.    Breast Cancer Screening - Outreach Outcomes & Actions Taken  : MMG up to date, next due 3/24/2026.    Low Dose CT Screening - Outreach Outcomes & Actions Taken  : Low Dose CT Order Placed and Low Dose CT Scheduled for 2025 at 8:00 am. Appointment details mailed to patient per patient request.     Additional Notes:             Chronic Disease Management:     Diabetes Measures      No results found for: "LABA1C", "HGBA1C"        [x]  Reviewed chart for active Diabetes diagnosis     []  Scheduled necessary follow up appointments if needed         Additional Notes:  Patient not listed on Diabetes Registry.           Hypertension Measures        BP Readings from Last 1 Encounters:   03/10/25 124/82           [x]  Reviewed chart for active Hypertension diagnosis     []  Reviewed & documented Home BP Cuff     []  Documented a Remote BP if needed " & applicable     []  Scheduled necessary follow up appointments with Primary Care if needed         Additional Notes:  Patient not listed on Hypertension Registry.           Provider Team Continuity:     Last PCP Visit Date: 2/20/2025          []  Reviewed Primary Care Provider Visits, Annual Wellness Visit, and Future          Appointments to ensure appointments have been scheduled and/or           completed        Additional Notes:  Patient eligible for Enhanced Annual Wellness Visit, unable to schedule at this time due to no available appointments. Patient informed, can contact insurance company to schedule.             Social Determinants of Health          [x]  Reviewed, completed, and/or updated the following sections:                  Food Insecurity, Transportation Needs, Financial Resource Strain,                 Tobacco Use        Additional Notes:  SDOH reviewed today.           Care Management, Digital Medicine, and/or Education Referrals    OPCM Risk Score: 41.2         Next Steps - Referral Actions: Referral placed for OPCM for Financial and Food Insecurity.        Additional Notes:

## 2025-07-11 ENCOUNTER — OFFICE VISIT (OUTPATIENT)
Dept: NEUROLOGY | Facility: CLINIC | Age: 55
End: 2025-07-11
Payer: MEDICARE

## 2025-07-11 VITALS
HEART RATE: 64 BPM | BODY MASS INDEX: 23.77 KG/M2 | OXYGEN SATURATION: 95 % | HEIGHT: 63 IN | DIASTOLIC BLOOD PRESSURE: 76 MMHG | SYSTOLIC BLOOD PRESSURE: 124 MMHG | WEIGHT: 134.13 LBS

## 2025-07-11 DIAGNOSIS — R51.9 WORSENING HEADACHES: ICD-10-CM

## 2025-07-11 DIAGNOSIS — G43.709 CHRONIC MIGRAINE WITHOUT AURA WITHOUT STATUS MIGRAINOSUS, NOT INTRACTABLE: Primary | ICD-10-CM

## 2025-07-11 DIAGNOSIS — G43.909 MIGRAINE WITHOUT STATUS MIGRAINOSUS, NOT INTRACTABLE, UNSPECIFIED MIGRAINE TYPE: ICD-10-CM

## 2025-07-11 DIAGNOSIS — R51.9 INTRACTABLE HEADACHE, UNSPECIFIED CHRONICITY PATTERN, UNSPECIFIED HEADACHE TYPE: ICD-10-CM

## 2025-07-11 PROCEDURE — 99999 PR PBB SHADOW E&M-EST. PATIENT-LVL IV: CPT | Mod: PBBFAC,,, | Performed by: FAMILY MEDICINE

## 2025-07-11 RX ORDER — ALBUTEROL SULFATE 90 UG/1
1 INHALANT RESPIRATORY (INHALATION) 2 TIMES DAILY
COMMUNITY

## 2025-07-11 RX ORDER — IPRATROPIUM BROMIDE 0.5 MG/2.5ML
500 SOLUTION RESPIRATORY (INHALATION)
COMMUNITY

## 2025-07-11 RX ORDER — TOPIRAMATE 50 MG/1
50 TABLET, FILM COATED ORAL 2 TIMES DAILY
Qty: 60 TABLET | Refills: 3 | Status: SHIPPED | OUTPATIENT
Start: 2025-07-11

## 2025-07-11 RX ORDER — MUPIROCIN 20 MG/G
1 OINTMENT TOPICAL 2 TIMES DAILY
COMMUNITY

## 2025-07-11 RX ORDER — ALBUTEROL SULFATE 0.83 MG/ML
2.5 SOLUTION RESPIRATORY (INHALATION)
COMMUNITY

## 2025-07-11 RX ORDER — CHOLECALCIFEROL (VITAMIN D3) 25 MCG
5000 TABLET ORAL DAILY
COMMUNITY

## 2025-07-18 ENCOUNTER — HOSPITAL ENCOUNTER (OUTPATIENT)
Dept: RADIOLOGY | Facility: HOSPITAL | Age: 55
Discharge: HOME | End: 2025-07-18
Attending: FAMILY MEDICINE
Payer: MEDICARE

## 2025-07-18 DIAGNOSIS — R51.9 WORSENING HEADACHES: ICD-10-CM

## 2025-07-18 PROCEDURE — 25500020 PHARM REV CODE 255: Performed by: FAMILY MEDICINE

## 2025-07-18 PROCEDURE — 70553 MRI BRAIN STEM W/O & W/DYE: CPT | Mod: TC

## 2025-07-18 PROCEDURE — A9585 GADOBUTROL INJECTION: HCPCS | Performed by: FAMILY MEDICINE

## 2025-07-18 PROCEDURE — 70553 MRI BRAIN STEM W/O & W/DYE: CPT | Mod: 26,,, | Performed by: RADIOLOGY

## 2025-07-18 RX ORDER — GADOBUTROL 604.72 MG/ML
5 INJECTION INTRAVENOUS
Status: COMPLETED | OUTPATIENT
Start: 2025-07-18 | End: 2025-07-18

## 2025-07-18 RX ADMIN — GADOBUTROL 5 ML: 604.72 INJECTION INTRAVENOUS at 07:07

## 2025-08-12 ENCOUNTER — HOSPITAL ENCOUNTER (OUTPATIENT)
Dept: RADIOLOGY | Facility: HOSPITAL | Age: 55
Discharge: HOME | End: 2025-08-12
Attending: FAMILY MEDICINE
Payer: MEDICARE

## 2025-08-12 DIAGNOSIS — F17.200 TOBACCO USE DISORDER: ICD-10-CM

## 2025-08-12 DIAGNOSIS — Z12.2 SCREENING FOR MALIGNANT NEOPLASM OF RESPIRATORY ORGAN: ICD-10-CM

## 2025-08-12 DIAGNOSIS — Z87.891 PERSONAL HISTORY OF NICOTINE DEPENDENCE: ICD-10-CM

## 2025-08-12 PROCEDURE — 71271 CT THORAX LUNG CANCER SCR C-: CPT | Mod: 26,,, | Performed by: RADIOLOGY

## 2025-08-12 PROCEDURE — 71271 CT THORAX LUNG CANCER SCR C-: CPT | Mod: TC

## 2025-08-25 ENCOUNTER — TELEPHONE (OUTPATIENT)
Dept: FAMILY MEDICINE | Facility: CLINIC | Age: 55
End: 2025-08-25
Payer: MEDICARE

## 2025-08-25 ENCOUNTER — CLINICAL SUPPORT (OUTPATIENT)
Dept: FAMILY MEDICINE | Facility: CLINIC | Age: 55
End: 2025-08-25
Payer: MEDICARE

## 2025-08-25 DIAGNOSIS — R30.0 DYSURIA: ICD-10-CM

## 2025-08-25 DIAGNOSIS — N94.89 VAGINAL BURNING: Primary | ICD-10-CM

## 2025-08-25 DIAGNOSIS — R30.0 DYSURIA: Primary | ICD-10-CM

## 2025-08-25 DIAGNOSIS — N94.89 VAGINAL BURNING: ICD-10-CM

## 2025-08-26 LAB
BACTERIA SPEC CULT: NORMAL /HPF
BILIRUB SERPL-MCNC: NORMAL MG/DL
BLOOD, POC UA: NORMAL
CASTS: NORMAL
CRYSTALS: NORMAL
GLUCOSE UR QL STRIP: NORMAL
KETONES UR QL STRIP: NORMAL
LEUKOCYTE ESTERASE URINE, POC: NORMAL
NITRITE, POC UA: NORMAL
PH, POC UA: 7
PROTEIN, POC: NORMAL
RBC CELLS COUNTED: NORMAL
SPECIFIC GRAVITY, POC UA: 1.02
UROBILINOGEN, POC UA: 4
WHITE BLOOD CELLS: NORMAL

## 2025-08-27 LAB — BACTERIA UR CULT: NORMAL

## (undated) DEVICE — ADHESIVE DERMABOND ADVANCED

## (undated) DEVICE — TOURNIQUET SB QC DP 18X4IN

## (undated) DEVICE — GOWN POLY REINF BRTH SLV XL

## (undated) DEVICE — SUT MONOCRYL 4-0 PS-2

## (undated) DEVICE — BANDAGE MATRIX HK LOOP 2IN 5YD

## (undated) DEVICE — PAD PREP CUFFED NS 24X48IN

## (undated) DEVICE — PACK SURGERY START

## (undated) DEVICE — SYR 10CC LUER LOCK

## (undated) DEVICE — NDL HYPO REG 25G X 1 1/2

## (undated) DEVICE — DRAPE HAND STERILE

## (undated) DEVICE — SPONGE COTTON TRAY 4X4IN

## (undated) DEVICE — COVER LIGHT HANDLE 80/CA

## (undated) DEVICE — TOWEL OR DISP STRL BLUE 4/PK

## (undated) DEVICE — STOCKINET 4INX48

## (undated) DEVICE — GOWN POLY REINF BRTH SLV LG

## (undated) DEVICE — SPLINT WRIST FOAM 8.5IN LG/RT

## (undated) DEVICE — BLADE SCALP OPHTL BEVEL STR

## (undated) DEVICE — GLOVE SURG BIOGEL LATEX SZ 7.5

## (undated) DEVICE — SEE L#120831

## (undated) DEVICE — ALCOHOL 70% ISOP W/GREEN 16OZ

## (undated) DEVICE — APPLICATOR CHLORAPREP ORN 26ML

## (undated) DEVICE — ELECTRODE REM PLYHSV RETURN 9

## (undated) DEVICE — PACK BASIC

## (undated) DEVICE — PAD CAST SPECIALIST 2X4

## (undated) DEVICE — MANIFOLD 4 PORT

## (undated) DEVICE — BLADE SURG #15 CARBON STEEL

## (undated) DEVICE — BANDAGE ESMARK ELASTIC ST 4X9